# Patient Record
Sex: MALE | Race: WHITE | Employment: OTHER | URBAN - METROPOLITAN AREA
[De-identification: names, ages, dates, MRNs, and addresses within clinical notes are randomized per-mention and may not be internally consistent; named-entity substitution may affect disease eponyms.]

---

## 2021-12-09 ENCOUNTER — HOSPITAL ENCOUNTER (OUTPATIENT)
Dept: LAB | Age: 76
Discharge: HOME | End: 2021-12-09
Payer: COMMERCIAL

## 2021-12-09 ENCOUNTER — OFFICE VISIT (OUTPATIENT)
Dept: FAMILY MEDICINE CLINIC | Age: 76
End: 2021-12-09
Payer: COMMERCIAL

## 2021-12-09 ENCOUNTER — TELEPHONE (OUTPATIENT)
Dept: FAMILY MEDICINE CLINIC | Age: 76
End: 2021-12-09

## 2021-12-09 VITALS
OXYGEN SATURATION: 96 % | DIASTOLIC BLOOD PRESSURE: 80 MMHG | SYSTOLIC BLOOD PRESSURE: 160 MMHG | RESPIRATION RATE: 10 BRPM | WEIGHT: 218.6 LBS | HEART RATE: 67 BPM | TEMPERATURE: 98.1 F

## 2021-12-09 DIAGNOSIS — Z12.5 PROSTATE CANCER SCREENING: ICD-10-CM

## 2021-12-09 DIAGNOSIS — G30.0 EARLY ONSET ALZHEIMER'S DEMENTIA WITH BEHAVIORAL DISTURBANCE (HCC): ICD-10-CM

## 2021-12-09 DIAGNOSIS — N40.0 BENIGN PROSTATIC HYPERPLASIA WITHOUT LOWER URINARY TRACT SYMPTOMS: ICD-10-CM

## 2021-12-09 DIAGNOSIS — M25.50 ARTHRALGIA, UNSPECIFIED JOINT: ICD-10-CM

## 2021-12-09 DIAGNOSIS — R73.9 ELEVATED BLOOD SUGAR: Primary | ICD-10-CM

## 2021-12-09 DIAGNOSIS — I10 PRIMARY HYPERTENSION: ICD-10-CM

## 2021-12-09 DIAGNOSIS — M10.00 IDIOPATHIC GOUT, UNSPECIFIED CHRONICITY, UNSPECIFIED SITE: Primary | ICD-10-CM

## 2021-12-09 DIAGNOSIS — F02.818 EARLY ONSET ALZHEIMER'S DEMENTIA WITH BEHAVIORAL DISTURBANCE (HCC): ICD-10-CM

## 2021-12-09 DIAGNOSIS — B35.1 ONYCHOMYCOSIS: ICD-10-CM

## 2021-12-09 DIAGNOSIS — E55.9 VITAMIN D DEFICIENCY: ICD-10-CM

## 2021-12-09 LAB
ALBUMIN SERPL-MCNC: 3.8 G/DL (ref 3.4–5)
ALBUMIN/GLOB SERPL: 1.2 {RATIO} (ref 0.8–1.7)
ALP SERPL-CCNC: 82 U/L (ref 45–117)
ALT SERPL-CCNC: 28 U/L (ref 16–61)
ANION GAP SERPL CALC-SCNC: 7 MMOL/L (ref 3–18)
AST SERPL-CCNC: 19 U/L (ref 10–38)
BASOPHILS # BLD: 0.1 K/UL (ref 0–0.1)
BASOPHILS NFR BLD: 1 % (ref 0–2)
BILIRUB SERPL-MCNC: 0.5 MG/DL (ref 0.2–1)
BUN SERPL-MCNC: 28 MG/DL (ref 7–18)
BUN/CREAT SERPL: 17 (ref 12–20)
CALCIUM SERPL-MCNC: 8.9 MG/DL (ref 8.5–10.1)
CHLORIDE SERPL-SCNC: 110 MMOL/L (ref 100–111)
CHOLEST SERPL-MCNC: 255 MG/DL
CO2 SERPL-SCNC: 24 MMOL/L (ref 21–32)
CREAT SERPL-MCNC: 1.65 MG/DL (ref 0.6–1.3)
DIFFERENTIAL METHOD BLD: ABNORMAL
EOSINOPHIL # BLD: 0.4 K/UL (ref 0–0.4)
EOSINOPHIL NFR BLD: 6 % (ref 0–5)
ERYTHROCYTE [DISTWIDTH] IN BLOOD BY AUTOMATED COUNT: 12.1 % (ref 11.6–14.5)
GLOBULIN SER CALC-MCNC: 3.2 G/DL (ref 2–4)
GLUCOSE SERPL-MCNC: 126 MG/DL (ref 74–99)
HCT VFR BLD AUTO: 35.9 % (ref 36–48)
HDLC SERPL-MCNC: 40 MG/DL (ref 40–60)
HDLC SERPL: 6.4 {RATIO} (ref 0–5)
HGB BLD-MCNC: 11.8 G/DL (ref 13–16)
IMM GRANULOCYTES # BLD AUTO: 0 K/UL (ref 0–0.04)
IMM GRANULOCYTES NFR BLD AUTO: 1 % (ref 0–0.5)
LDLC SERPL CALC-MCNC: 171.4 MG/DL (ref 0–100)
LIPID PROFILE,FLP: ABNORMAL
LYMPHOCYTES # BLD: 1.2 K/UL (ref 0.9–3.6)
LYMPHOCYTES NFR BLD: 21 % (ref 21–52)
MCH RBC QN AUTO: 29.6 PG (ref 24–34)
MCHC RBC AUTO-ENTMCNC: 32.9 G/DL (ref 31–37)
MCV RBC AUTO: 90 FL (ref 78–100)
MONOCYTES # BLD: 0.4 K/UL (ref 0.05–1.2)
MONOCYTES NFR BLD: 7 % (ref 3–10)
NEUTS SEG # BLD: 3.9 K/UL (ref 1.8–8)
NEUTS SEG NFR BLD: 64 % (ref 40–73)
NRBC # BLD: 0 K/UL (ref 0–0.01)
NRBC BLD-RTO: 0 PER 100 WBC
PLATELET # BLD AUTO: 226 K/UL (ref 135–420)
PMV BLD AUTO: 10.8 FL (ref 9.2–11.8)
POTASSIUM SERPL-SCNC: 4 MMOL/L (ref 3.5–5.5)
PROT SERPL-MCNC: 7 G/DL (ref 6.4–8.2)
PSA SERPL-MCNC: 1.6 NG/ML (ref 0–4)
RBC # BLD AUTO: 3.99 M/UL (ref 4.35–5.65)
SODIUM SERPL-SCNC: 141 MMOL/L (ref 136–145)
TRIGL SERPL-MCNC: 218 MG/DL (ref ?–150)
VLDLC SERPL CALC-MCNC: 43.6 MG/DL
WBC # BLD AUTO: 6 K/UL (ref 4.6–13.2)

## 2021-12-09 PROCEDURE — 99204 OFFICE O/P NEW MOD 45 MIN: CPT | Performed by: FAMILY MEDICINE

## 2021-12-09 PROCEDURE — 80053 COMPREHEN METABOLIC PANEL: CPT

## 2021-12-09 PROCEDURE — 80061 LIPID PANEL: CPT

## 2021-12-09 PROCEDURE — 86592 SYPHILIS TEST NON-TREP QUAL: CPT

## 2021-12-09 PROCEDURE — 85025 COMPLETE CBC W/AUTO DIFF WBC: CPT

## 2021-12-09 PROCEDURE — G0103 PSA SCREENING: HCPCS

## 2021-12-09 PROCEDURE — 84153 ASSAY OF PSA TOTAL: CPT

## 2021-12-09 RX ORDER — TAMSULOSIN HYDROCHLORIDE 0.4 MG/1
CAPSULE ORAL
COMMUNITY
Start: 2021-10-03 | End: 2021-12-09 | Stop reason: SDUPTHER

## 2021-12-09 RX ORDER — COLCHICINE 0.6 MG/1
0.6 TABLET ORAL 2 TIMES DAILY
Qty: 40 TABLET | Refills: 2 | Status: SHIPPED | OUTPATIENT
Start: 2021-12-09

## 2021-12-09 RX ORDER — COLCHICINE 0.6 MG/1
TABLET ORAL
COMMUNITY
Start: 2021-11-15 | End: 2021-12-09 | Stop reason: SDUPTHER

## 2021-12-09 RX ORDER — AA/PROT/LYSINE/METHIO/VIT C/B6 50-12.5 MG
TABLET ORAL
COMMUNITY
End: 2022-07-25

## 2021-12-09 RX ORDER — ALLOPURINOL 100 MG/1
100 TABLET ORAL 2 TIMES DAILY
Qty: 40 TABLET | Refills: 1 | Status: SHIPPED | OUTPATIENT
Start: 2021-12-09 | End: 2022-01-17

## 2021-12-09 RX ORDER — CELECOXIB 200 MG/1
200 CAPSULE ORAL 2 TIMES DAILY
Qty: 60 CAPSULE | Refills: 2 | Status: SHIPPED | OUTPATIENT
Start: 2021-12-09 | End: 2022-02-28

## 2021-12-09 RX ORDER — TAMSULOSIN HYDROCHLORIDE 0.4 MG/1
0.4 CAPSULE ORAL DAILY
Qty: 90 CAPSULE | Refills: 1 | Status: SHIPPED | OUTPATIENT
Start: 2021-12-09

## 2021-12-09 RX ORDER — FINASTERIDE 5 MG/1
TABLET, FILM COATED ORAL
COMMUNITY
Start: 2021-10-04 | End: 2021-12-09 | Stop reason: SDUPTHER

## 2021-12-09 RX ORDER — LABETALOL 300 MG/1
300 TABLET, FILM COATED ORAL 2 TIMES DAILY
Qty: 60 TABLET | Refills: 5 | Status: SHIPPED | OUTPATIENT
Start: 2021-12-09

## 2021-12-09 RX ORDER — TRIAMTERENE AND HYDROCHLOROTHIAZIDE 37.5; 25 MG/1; MG/1
CAPSULE ORAL
COMMUNITY
Start: 2021-09-04 | End: 2021-12-09 | Stop reason: SDUPTHER

## 2021-12-09 RX ORDER — TRIAMTERENE AND HYDROCHLOROTHIAZIDE 37.5; 25 MG/1; MG/1
1 CAPSULE ORAL DAILY
Qty: 90 CAPSULE | Refills: 1 | Status: SHIPPED | OUTPATIENT
Start: 2021-12-09 | End: 2022-07-25

## 2021-12-09 RX ORDER — FINASTERIDE 5 MG/1
5 TABLET, FILM COATED ORAL DAILY
Qty: 90 TABLET | Refills: 1 | Status: SHIPPED | OUTPATIENT
Start: 2021-12-09

## 2021-12-09 RX ORDER — ALLOPURINOL 100 MG/1
TABLET ORAL
COMMUNITY
Start: 2021-09-04 | End: 2021-12-09 | Stop reason: SDUPTHER

## 2021-12-09 RX ORDER — LABETALOL 300 MG/1
TABLET, FILM COATED ORAL
COMMUNITY
Start: 2021-11-30 | End: 2021-12-09 | Stop reason: SDUPTHER

## 2021-12-09 NOTE — PROGRESS NOTES
Amy Keith is a 77 y.o. male  presents for establish care. He has chronic issues but recently has memory problems and foot pain. Allergies   Allergen Reactions    Other Medication Unknown (comments)     Patient says he is allergic to different medications but is not sure what they are. Outpatient Medications Marked as Taking for the 12/9/21 encounter (Office Visit) with Cheyenne Patton MD   Medication Sig Dispense Refill    coenzyme q10 (Co Q-10) 10 mg cap Take  by mouth.  cholecalciferol, vitamin D3, (VITAMIN D3 PO) Take  by mouth.  ascorbic acid (VITAMIN C PO) Take  by mouth.  tamsulosin (FLOMAX) 0.4 mg capsule Take 1 Capsule by mouth daily. 90 Capsule 1    finasteride (PROSCAR) 5 mg tablet Take 1 Tablet by mouth daily. 90 Tablet 1    colchicine 0.6 mg tablet Take 1 Tablet by mouth two (2) times a day. 40 Tablet 2    allopurinoL (ZYLOPRIM) 100 mg tablet Take 1 Tablet by mouth two (2) times a day. 40 Tablet 1    labetaloL (NORMODYNE) 300 mg tablet Take 1 Tablet by mouth two (2) times a day. 60 Tablet 5    triamterene-hydroCHLOROthiazide (DYAZIDE) 37.5-25 mg per capsule Take 1 Capsule by mouth daily. 90 Capsule 1    celecoxib (CELEBREX) 200 mg capsule Take 1 Capsule by mouth two (2) times a day for 90 days. 60 Capsule 2     There is no problem list on file for this patient. Past Medical History:   Diagnosis Date    Arthritis     Tompkins esophagus     DM (diabetes mellitus) ([facility] [address]     H/O kidney removal     Left    Hernia, umbilical     HTN (hypertension)     Hypercholesteremia     Neuropathy     Poor circulation      Social History     Tobacco Use    Smoking status: Former Smoker    Smokeless tobacco: Former User   Substance and Sexual Activity    Alcohol use: Not Currently     No family history on file. Review of Systems   Constitutional: Negative for chills, fever, malaise/fatigue and weight loss. Eyes: Negative for blurred vision.    Respiratory: Negative for cough, shortness of breath and wheezing. Cardiovascular: Negative for chest pain. Gastrointestinal: Negative for nausea and vomiting. Musculoskeletal: Positive for joint pain. Negative for myalgias. Skin: Negative for rash. Neurological: Negative for weakness. Psychiatric/Behavioral: Positive for memory loss. Vitals:    12/09/21 1048   BP: (!) 160/80   Pulse: 67   Resp: 10   Temp: 98.1 °F (36.7 °C)   TempSrc: Tympanic   SpO2: 96%   Weight: 218 lb 9.6 oz (99.2 kg)   PainSc:   5   PainLoc: Generalized       Physical Exam  Vitals and nursing note reviewed. Constitutional:       Appearance: Normal appearance. Neck:      Thyroid: No thyromegaly. Cardiovascular:      Rate and Rhythm: Normal rate and regular rhythm. Pulses: Normal pulses. Heart sounds: Normal heart sounds. Pulmonary:      Effort: Pulmonary effort is normal.      Breath sounds: Normal breath sounds. Musculoskeletal:         General: Normal range of motion. Cervical back: Normal range of motion and neck supple. Skin:     General: Skin is warm and dry. Neurological:      Mental Status: He is alert and oriented to person, place, and time. Psychiatric:         Mood and Affect: Mood normal.         Assessment/Plan      ICD-10-CM ICD-9-CM    1. Idiopathic gout, unspecified chronicity, unspecified site  M10.00 274.9 colchicine 0.6 mg tablet      allopurinoL (ZYLOPRIM) 100 mg tablet   2. Benign prostatic hyperplasia without lower urinary tract symptoms  N40.0 600.00 tamsulosin (FLOMAX) 0.4 mg capsule      finasteride (PROSCAR) 5 mg tablet   3. Primary hypertension  I10 401.9 labetaloL (NORMODYNE) 300 mg tablet      triamterene-hydroCHLOROthiazide (DYAZIDE) 37.5-25 mg per capsule      CBC WITH AUTOMATED DIFF      LIPID PANEL      METABOLIC PANEL, COMPREHENSIVE   4. Vitamin D deficiency  E55.9 268.9    5. Prostate cancer screening  Z12.5 V76.44 PSA SCREENING (SCREENING)   6.  Arthralgia, unspecified joint  M25.50 719.40 celecoxib (CELEBREX) 200 mg capsule   7. 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 I have discussed the diagnosis with the patient and the intended plan of care as seen in the above orders. The patient has received an after-visit summary and questions were answered concerning future plans. I have discussed medication, side effects, and warnings with the patient in detail. The patient verbalized understanding and is in agreement with the plan of care. The patient will contact the office with any additional concerns.       lab results and schedule of future lab studies reviewed with patient    Gali Kat MD

## 2021-12-09 NOTE — PROGRESS NOTES
New patient here to establish care. He would like a referral to Podiatry for nail fungus, c/o arthritic pain all over. He has been shaking a lot lately. Needs medication refills. 1. \"Have you been to the ER, urgent care clinic since your last visit? Hospitalized since your last visit? \" No    2. \"Have you seen or consulted any other health care providers outside of the [address] [place] [address] since your last visit? \" No     3. For patients aged 39-70: Has the patient had a colonoscopy / FIT/ Cologuard? No     If the patient is female:    4. For patients aged 41-77: Has the patient had a mammogram within the past 2 years? NA based on age or sex    11. For patients aged 21-65: Has the patient had a pap smear?  NA based on age or sex

## 2021-12-09 NOTE — PATIENT INSTRUCTIONS
Benign Prostatic Hyperplasia: Care Instructions  Your Care Instructions     Benign prostatic hyperplasia, or BPH, is an enlarged prostate gland. The prostate is a small gland that makes some of the fluid in semen. Prostate enlargement happens to almost all men as they age. It is usually not serious. BPH does not cause prostate cancer. As the prostate gets bigger, it may partly block the flow of urine. You may have a hard time getting a urine stream started or completely stopped. You may have a weak urine stream, or you may have to urinate more often than you used to, especially at night. Most men find these problems easy to manage. You do not need treatment unless your symptoms bother you a lot or you have other problems, such as bladder infections or stones. In these cases, medicines may help. Surgery is not needed unless the urine flow is blocked or the symptoms do not get better with medicine. Follow-up care is a key part of your treatment and safety. Be sure to make and go to all appointments, and call your doctor if you are having problems. It's also a good idea to know your test results and keep a list of the medicines you take. How can you care for yourself at home? · Urinate as much as you can, relax for a few moments, and then try to urinate again. · Sit on the toilet to urinate. · Avoid caffeine and alcohol. These drinks will increase how often you need to urinate. · Many over-the-counter cold and allergy medicines can make the symptoms of BPH worse. Avoid antihistamines, decongestants, and allergy pills, if you can. Read the warnings on the package. · If you take any prescription medicines such as muscle relaxants, pain medicines, or medicines for depression or anxiety, ask your doctor or pharmacist if they can cause urination problems. When should you call for help?    Call your doctor now or seek immediate medical care if:    · You cannot urinate at all.     · You have symptoms of a urinary infection. For example:  ? You have blood or pus in your urine. ? You have pain in your back just below your rib cage. This is called flank pain. ? You have a fever, chills, or body aches. ? It hurts to urinate. ? You have groin or belly pain. Watch closely for changes in your health, and be sure to contact your doctor if:    · It hurts when you ejaculate.     · Your urinary problems get a lot worse or bother you a lot. Where can you learn more? Go to http://www.gray.com/  Enter Z714 in the search box to learn more about \"Benign Prostatic Hyperplasia: Care Instructions. \"  Current as of: February 10, 2021               Content Version: 13.0  © 2006-2021 [employer]. Care instructions adapted under license by [employer] (which disclaims liability or warranty for this information). If you have questions about a medical condition or this instruction, always ask your healthcare professional. [place] [address] any warranty or liability for your use of this information.

## 2021-12-09 NOTE — TELEPHONE ENCOUNTER
Chief Complaint   Patient presents with    Attention Deficit Disorder     1. Have you been to the ER, urgent care clinic since your last visit? Hospitalized since your last visit? No    2. Have you seen or consulted any other health care providers outside of the [address] [place] [address] since your last visit? Include any pap smears or colon screening.  No Patient is requesting a New order for a Blood Glucose Meter, he  Needs everything that goes with the meter. Please review and advise, Patient can be reached at [phone].

## 2021-12-10 LAB — RPR SER QL: NONREACTIVE

## 2021-12-13 RX ORDER — BLOOD SUGAR DIAGNOSTIC
STRIP MISCELLANEOUS
Qty: 100 STRIP | Refills: 5 | Status: SHIPPED | OUTPATIENT
Start: 2021-12-13 | End: 2022-07-25

## 2021-12-13 RX ORDER — LANCING DEVICE/LANCETS
KIT MISCELLANEOUS
Qty: 1 KIT | Refills: 2 | Status: SHIPPED | OUTPATIENT
Start: 2021-12-13 | End: 2022-07-25

## 2021-12-13 RX ORDER — BLOOD-GLUCOSE METER
EACH MISCELLANEOUS
Qty: 1 EACH | Refills: 0 | Status: SHIPPED | OUTPATIENT
Start: 2021-12-13 | End: 2022-07-25

## 2021-12-14 ENCOUNTER — TELEPHONE (OUTPATIENT)
Dept: FAMILY MEDICINE CLINIC | Age: 76
End: 2021-12-14

## 2021-12-14 DIAGNOSIS — R73.9 ELEVATED BLOOD SUGAR: Primary | ICD-10-CM

## 2021-12-14 NOTE — TELEPHONE ENCOUNTER
Buster Ly, Patient daughter, called requesting an order for Free style lite glucose meter with Lancets and test strips, patient insurance  Does not cover the orders that was submitted on 12/09/21.

## 2021-12-22 RX ORDER — BLOOD-GLUCOSE METER
KIT MISCELLANEOUS
Qty: 100 STRIP | Refills: 2 | Status: SHIPPED | OUTPATIENT
Start: 2021-12-22 | End: 2022-07-25

## 2021-12-22 RX ORDER — LANCETS
EACH MISCELLANEOUS
Qty: 100 EACH | Refills: 3 | Status: SHIPPED | OUTPATIENT
Start: 2021-12-22 | End: 2022-07-25

## 2021-12-22 RX ORDER — INSULIN PUMP SYRINGE, 3 ML
EACH MISCELLANEOUS
Qty: 1 KIT | Refills: 0 | Status: SHIPPED | OUTPATIENT
Start: 2021-12-22 | End: 2022-07-25

## 2022-01-05 ENCOUNTER — TELEPHONE (OUTPATIENT)
Dept: FAMILY MEDICINE CLINIC | Age: 77
End: 2022-01-05

## 2022-01-05 NOTE — TELEPHONE ENCOUNTER
Patients daughter Bethany Araujo called the office stating patient has been having abdominal pain x 4 days, he has also had cough, SOB, and tightness in his chest x 2 days. She says she can hear patient wheezing at night. She was advised to take patient to  and has agreed with this plan. Samantha says she also has concerns with patients behavior and shaking. She says his appt is not until March but she wanted to know if he can be seen sooner. Told Samantha I will send a message to Dr. Yesica Neumann nurse who will look at his schedule to see if patients appt can be moved up. Nancy Cochran would like a return call at [phone].

## 2022-01-15 DIAGNOSIS — M10.00 IDIOPATHIC GOUT, UNSPECIFIED CHRONICITY, UNSPECIFIED SITE: ICD-10-CM

## 2022-01-17 RX ORDER — ALLOPURINOL 100 MG/1
TABLET ORAL
Qty: 40 TABLET | Refills: 1 | Status: SHIPPED | OUTPATIENT
Start: 2022-01-17 | End: 2022-02-28

## 2022-02-27 DIAGNOSIS — M10.00 IDIOPATHIC GOUT, UNSPECIFIED CHRONICITY, UNSPECIFIED SITE: ICD-10-CM

## 2022-02-28 DIAGNOSIS — M25.50 ARTHRALGIA, UNSPECIFIED JOINT: ICD-10-CM

## 2022-02-28 RX ORDER — ALLOPURINOL 100 MG/1
TABLET ORAL
Qty: 40 TABLET | Refills: 1 | Status: SHIPPED | OUTPATIENT
Start: 2022-02-28

## 2022-02-28 RX ORDER — CELECOXIB 200 MG/1
CAPSULE ORAL
Qty: 60 CAPSULE | Refills: 2 | Status: SHIPPED | OUTPATIENT
Start: 2022-02-28 | End: 2022-07-25

## 2022-04-11 ENCOUNTER — DOCUMENTATION ONLY (OUTPATIENT)
Dept: PULMONOLOGY | Age: 77
End: 2022-04-11

## 2022-04-11 NOTE — PROGRESS NOTES
Sander am for pt's daughter, Makayla [place] [phone] to pretty np appt - sob - Dr Luis Ramirez in file cabinet

## 2022-04-27 ENCOUNTER — OFFICE VISIT (OUTPATIENT)
Dept: PULMONOLOGY | Age: 77
End: 2022-04-27
Payer: MEDICARE

## 2022-04-27 VITALS
SYSTOLIC BLOOD PRESSURE: 114 MMHG | OXYGEN SATURATION: 97 % | DIASTOLIC BLOOD PRESSURE: 66 MMHG | WEIGHT: 217 LBS | HEIGHT: 69 IN | HEART RATE: 66 BPM | BODY MASS INDEX: 32.14 KG/M2

## 2022-04-27 DIAGNOSIS — R06.00 DYSPNEA, UNSPECIFIED TYPE: Primary | ICD-10-CM

## 2022-04-27 PROCEDURE — 94729 DIFFUSING CAPACITY: CPT | Performed by: INTERNAL MEDICINE

## 2022-04-27 PROCEDURE — 94727 GAS DIL/WSHOT DETER LNG VOL: CPT | Performed by: INTERNAL MEDICINE

## 2022-04-27 PROCEDURE — 94060 EVALUATION OF WHEEZING: CPT | Performed by: INTERNAL MEDICINE

## 2022-05-11 ENCOUNTER — OFFICE VISIT (OUTPATIENT)
Dept: PULMONOLOGY | Age: 77
End: 2022-05-11
Payer: MEDICARE

## 2022-05-11 VITALS
RESPIRATION RATE: 18 BRPM | HEIGHT: 69 IN | BODY MASS INDEX: 32.23 KG/M2 | OXYGEN SATURATION: 97 % | WEIGHT: 217.6 LBS | SYSTOLIC BLOOD PRESSURE: 170 MMHG | TEMPERATURE: 98.2 F | HEART RATE: 63 BPM | DIASTOLIC BLOOD PRESSURE: 80 MMHG

## 2022-05-11 DIAGNOSIS — R93.89 ABNORMAL CXR: Primary | ICD-10-CM

## 2022-05-11 DIAGNOSIS — J44.9 CHRONIC OBSTRUCTIVE PULMONARY DISEASE, UNSPECIFIED COPD TYPE (HCC): ICD-10-CM

## 2022-05-11 DIAGNOSIS — M25.50 ARTHRALGIA, UNSPECIFIED JOINT: ICD-10-CM

## 2022-05-11 DIAGNOSIS — E11.8 CONTROLLED TYPE 2 DIABETES MELLITUS WITH COMPLICATION, WITHOUT LONG-TERM CURRENT USE OF INSULIN (HCC): ICD-10-CM

## 2022-05-11 DIAGNOSIS — Z87.891 EX-SMOKER: ICD-10-CM

## 2022-05-11 DIAGNOSIS — Z90.5 HISTORY OF NEPHRECTOMY: ICD-10-CM

## 2022-05-11 DIAGNOSIS — Z86.16 HISTORY OF COVID-19: ICD-10-CM

## 2022-05-11 DIAGNOSIS — R06.2 WHEEZING: ICD-10-CM

## 2022-05-11 DIAGNOSIS — R06.09 DYSPNEA ON EXERTION: ICD-10-CM

## 2022-05-11 PROCEDURE — G8536 NO DOC ELDER MAL SCRN: HCPCS | Performed by: INTERNAL MEDICINE

## 2022-05-11 PROCEDURE — 1101F PT FALLS ASSESS-DOCD LE1/YR: CPT | Performed by: INTERNAL MEDICINE

## 2022-05-11 PROCEDURE — G8427 DOCREV CUR MEDS BY ELIG CLIN: HCPCS | Performed by: INTERNAL MEDICINE

## 2022-05-11 PROCEDURE — G8417 CALC BMI ABV UP PARAM F/U: HCPCS | Performed by: INTERNAL MEDICINE

## 2022-05-11 PROCEDURE — 99205 OFFICE O/P NEW HI 60 MIN: CPT | Performed by: INTERNAL MEDICINE

## 2022-05-11 PROCEDURE — G8510 SCR DEP NEG, NO PLAN REQD: HCPCS | Performed by: INTERNAL MEDICINE

## 2022-05-11 PROCEDURE — 94618 PULMONARY STRESS TESTING: CPT | Performed by: INTERNAL MEDICINE

## 2022-05-11 RX ORDER — SERTRALINE HYDROCHLORIDE 100 MG/1
100 TABLET, FILM COATED ORAL DAILY
COMMUNITY
Start: 2022-03-09

## 2022-05-11 RX ORDER — FLUTICASONE FUROATE, UMECLIDINIUM BROMIDE AND VILANTEROL TRIFENATATE 200; 62.5; 25 UG/1; UG/1; UG/1
1 POWDER RESPIRATORY (INHALATION) DAILY
Qty: 1 EACH | Refills: 0 | Status: SHIPPED | COMMUNITY
Start: 2022-05-11 | End: 2022-09-12

## 2022-05-11 RX ORDER — ALBUTEROL SULFATE 90 UG/1
1 AEROSOL, METERED RESPIRATORY (INHALATION)
Qty: 1 EACH | Refills: 0 | Status: SHIPPED | COMMUNITY
Start: 2022-05-11 | End: 2022-05-11 | Stop reason: CLARIF

## 2022-05-11 RX ORDER — FLUTICASONE FUROATE, UMECLIDINIUM BROMIDE AND VILANTEROL TRIFENATATE 200; 62.5; 25 UG/1; UG/1; UG/1
1 POWDER RESPIRATORY (INHALATION) DAILY
Qty: 1 EACH | Refills: 3 | Status: SHIPPED | OUTPATIENT
Start: 2022-05-11 | End: 2022-07-25

## 2022-05-11 RX ORDER — ALBUTEROL SULFATE 90 UG/1
1 AEROSOL, METERED RESPIRATORY (INHALATION)
Qty: 1 EACH | Refills: 2 | Status: SHIPPED | OUTPATIENT
Start: 2022-05-11

## 2022-05-11 NOTE — PROGRESS NOTES
Margotjhon Mota presents today for   Chief Complaint   Patient presents with    Shortness of Breath     on exertion        Is someone accompanying this pt? Daughter    Is the patient using any DME equipment during [address]? No    -DME Company NA    Depression Screening:  3 most recent PHQ Screens 12/9/2021   Little interest or pleasure in doing things Not at all   Feeling down, depressed, irritable, or hopeless Not at all   Total Score PHQ 2 0       Learning Assessment:  Learning Assessment 5/11/2022   PRIMARY LEARNER Patient   PRIMARY LANGUAGE ENGLISH   LEARNER PREFERENCE PRIMARY DEMONSTRATION   RELATIONSHIP SELF       Abuse Screening:  No flowsheet data found. Fall Risk  No flowsheet data found. Coordination of Care:  1. Have you been to the ER, urgent care clinic since your last visit? Hospitalized since your last visit? Yes; Name: 1/2022 [facility] ED SOB     2. Have you seen or consulted any other health care providers outside of the [address] [place] [address] since your last visit? Include any pap smears or colon screening.  No

## 2022-05-11 NOTE — PROGRESS NOTES
Order placed for 6 minute walk test, per Verbal Order from Dr. Zonia Cam on 5/11/2022. Last office visit: 5/11/2022  Follow up Visit: Due 3 Months ( Aug 2022)    Provider is aware of last office visit and follow up.

## 2022-05-11 NOTE — PATIENT INSTRUCTIONS
Please call our clinic back at [phone] or send a message on [employer] if you have any questions or concerns or if you are experiencing any of the following:     You have not received a follow up appointment within 30 days prior the recommended follow up time.  If you are not tolerating treatment plan and/or not able to obtain equipment or prescribed medication(s).  if you are experiencing any difficulties with the Durable Medical Equipment  (DME) Company you may be using or is assigned to you.  Two weeks have passed and you have not received an appointment for a scheduled procedure.  Two weeks have passed since you underwent a test and/or procedure and you have not received your results. If you are using a CPAP/BIPAP, or Home Ventilator Device- Please note the following. Currently, many DMEs are experiencing supply chain difficulties and orders for equipment may be back logged several weeks to months. [address] [employer] ) company is supposed to provide you with replacement filters, tubing and masks. You can either call your DME when you need new supplies or you can arrange for an automatic shipment schedule.  Your need to be seen by our office at lat minimum of every 12 months in order to renew the prescription for these supplies.  Please make note of who your DME company is and their phone number.  Please make sure that you clean your mask and hosing on a regular basis. Your DME can provide you with additional information regarding proper care and cleaning of your device           Safety is strongly encouraged. You should not drive if sleepy, tired, distracted and/or  fatigued.        -Thank you       Instructions for how to correctly use a Metered Dose Inhaler (MDI)

## 2022-05-11 NOTE — PROGRESS NOTES
[facility]  Pulmonary, Critical Care, and Sleep Medicine    Office Progress Note- Initial Evaluation      Primary Care Physician: Ilene Jaime MD     Reason for Visit:  Evaluation for Dyspnea    Assessment:  1. Abnormal CXR: [place] noted on CXR Jan 2022. Patient COVID+ at that time. Patient needs repeat thoracic imaging  2. COPD: Non-reversible obstruction on recent PFTs. Patient had difficulty with testing. He appears to have reduced air movement on today's exam. No rhonchi, crackles or wheezing. He does not appear to have an acute exacerbation, but probably is sub-optimized. Patient denies a history of asthma. He does have a high eosinophil count which raises the question for possible asthma- will need to work up further  3. Dyspnea: Recent cardiac evaluation unremarkable, No oxygen requirement today on 6MW. Workup ongoing  4. Fatigue/EDS: could be due to several causes: deconditioning, COPD, possible YASIR, grief/depression (recent passing of his wife,   5. Arthralgias  6. Gout  7. Diabetes  8. CKD, S/P nephrectomy- distant- unclear why  9. Hypertension- BP is elevated today in clinic. He is asymptomatic. He does not consistently take his BP medicaton  10. Barrettes Esophagus- pending EGD  11. Resting/ Essential tremor  12. Depression: On Zoloft, Wife passes away in January 2022  13. Ex-smoker: 85 pack year  14. Obesity: Body mass index is 32.13 kg/m². Plan:  · Start patient on a trial of Trelegy- patient to call our clinic if he has difficulties getting long acting inhalers  · Start PRN albuterol  · Monitor EMMA/LABA effect on patient's resting tremor- I dicussed this with the patient and his daughter. · Check JERE, CBC, IgE, Allergy Panel, RF  · Obtain Non-contrast CT scan  · Future sleep testing  · Continue with smoking cessation  · Follow up with Primary Care Provider (PCP) as directed and for routine health care maintenance.   · Follow-up: 3 months , sooner should new symptoms or problems arise. History of Present Illness: Mr. Austin Conti is a 69 y.o. male patient who presents for evaluation for dyspnea. The history was provided by the patient, and his daughter. The patient is an ex-smoker. He was born and previously living in [place]. He moved down to the [facility] in the Fall of 2021. Per the patient's daughter, the patient was having a difficult time getting medication for the patient and his ill wife. The patient had been caring for his wife, who passed away in 2022. He is currently living in an apartment with another daughter next door. The patient reports that he has been experiencing increasing dyspnea on exertion ever since he got his COVID vaccination(s). In January 2022, the patient did indeed contract COVID-19. He went to [facility] ED but was discharged home. He was reports that he was not treated with steroids or other adjuvant therapies. The patient denies any sputum or hemoptysis. He denies a prior history of asthma. He does not use inhalers. He does note some intermittent coughing spells with near emesis. No fever or chills. He does note some intermittent mid-chest discomfort. He recently underwent a nuclear, cardiac  stress test which was unremarkable. He reports body aches and arthralgias. He reports a good appetite, no unintended weight loss. He does sweat during the night. He does not know if he snores. He is tired during the day- \"Im tired all the time\". He reports getting up to use the bathroom at night every 2-3 hoursHe does have a history of gout. No photosensitivity. No oral ulcers No history of childhood respiratory infections    Patient does have a history of Barrettes esophagus. He has not had an EGD for several years. He saw a GI provider earlier this week and is pending a repeat EGD. The patient's BP was elevated today in clinic. He is asymptomatic.  He isprescribed labetalol but he is not taking on a consistent basis    Occupation:   Retired: Prior  for [employer]- 30 years                         3 most recent [address] 2022   [address] [facility] [address] interest or pleasure in doing things Not at all   Feeling down, depressed, irritable, or hopeless Not at all   Total Score PHQ 2 0       Immunization History: There is no immunization history on file for this patient. Past Medical History:  Past Medical History:   Diagnosis Date    Arthritis     Tompkins esophagus     DM (diabetes mellitus) ([address]     H/O kidney removal     Left    Hernia, umbilical     HTN (hypertension)     Hypercholesteremia     Neuropathy     Poor circulation        Past Surgical History:  History reviewed. No pertinent surgical history. Family History:  History reviewed. No pertinent family history.     Social History:  Social History     Tobacco Use    Smoking status: Former Smoker     Packs/day: 2.50     Years: 34.00     Pack years: 85.00     Types: Cigarettes     Quit date: 2002     Years since quittin.8    Smokeless tobacco: Former User   Substance Use Topics    Alcohol use: Not Currently    Drug use: Not Currently          Medications:  Current Outpatient Medications on File Prior to Visit   Medication Sig Dispense Refill    allopurinoL (ZYLOPRIM) 100 mg tablet take 1 tablet by mouth twice a day 40 Tablet 1    celecoxib (CELEBREX) 200 mg capsule take 1 capsule by mouth twice a day (Patient not taking: Reported on 2022) 60 Capsule 2    Blood-Glucose Meter (FreeStyle Lite Meter) monitoring kit Use bid as directed 1 Kit 0    glucose blood VI test strips (FreeStyle Lite Strips) strip Use bid as directed 100 Strip 2    lancets misc Use bid 100 Each 3    Blood-Glucose Meter (Accu-Chek Elin Plus Meter) misc Use to check glucose daily E11.6 1 Each 0    glucose blood VI test strips (Accu-Chek Elin Plus test strp) strip Use to check glucose daily E11.6 100 Strip 5    Lancing Device with Lancets (Accu-Chek Soft Dev Lancets) kit Use to check glucose daily. E11.6 1 Kit 2    coenzyme q10 (Co Q-10) 10 mg cap Take  by mouth.  cholecalciferol, vitamin D3, (VITAMIN D3 PO) Take  by mouth.  ascorbic acid (VITAMIN C PO) Take  by mouth.  tamsulosin (FLOMAX) 0.4 mg capsule Take 1 Capsule by mouth daily. 90 Capsule 1    finasteride (PROSCAR) 5 mg tablet Take 1 Tablet by mouth daily. 90 Tablet 1    colchicine 0.6 mg tablet Take 1 Tablet by mouth two (2) times a day. 40 Tablet 2    sertraline (ZOLOFT) 100 mg tablet Take 100 mg by mouth daily.  labetaloL (NORMODYNE) 300 mg tablet Take 1 Tablet by mouth two (2) times a day. (Patient not taking: Reported on 5/11/2022) 60 Tablet 5    triamterene-hydroCHLOROthiazide (DYAZIDE) 37.5-25 mg per capsule Take 1 Capsule by mouth daily. (Patient not taking: Reported on 5/11/2022) 90 Capsule 1     No current facility-administered medications on file prior to visit. Allergy:  Allergies   Allergen Reactions    Other Medication Unknown (comments)     Patient says he is allergic to different medications but is not sure what they are.        Review of Systems  General ROS: positive for  - fever and night sweats  negative for - chills, fever, hot flashes, malaise or weight loss  ENT ROS: negative for - epistaxis, headaches,   nasal congestion, nasal discharge, nasal polyps, oral lesions, sinus pain, sneezing, sore throat, tinnitus, vertigo, visual changes or vocal changes, positive for hearing decrement  Hematological and Lymphatic ROS: negative for - bleeding problems, blood clots, bruising, jaundice, pallor or swollen lymph nodes  Endocrine ROS: negative for - polydipsia/polyuria, skin changes, temperature intolerance or unexpected weight changes  Respiratory ROS: positive for - shortness of breath and wheezing  negative for - hemoptysis, orthopnea, pleuritic pain, sputum changes or stridor  Cardiovascular ROS: positive for - chest pain, dyspnea on exertion and shortness of breath  negative for - edema, irregular heartbeat, loss of consciousness, orthopnea, palpitations or paroxysmal nocturnal dyspnea  Gastrointestinal ROS: no abdominal pain, change in bowel habits, or black or bloody stools  Genito-Urinary ROS: no dysuria, trouble voiding, or hematuria  Musculoskeletal ROS: positive for - joint pain and muscle pain  Neurological ROS: no TIA or stroke symptoms  Dermatological ROS: negative for - pruritus, rash or skin lesion changes   Psychological ROS: negative   Otherwise negative and per HPI      Physical Exam:  Blood pressure (!) 170/80, pulse 63, temperature 98.2 °F (36.8 °C), temperature source Oral, resp. rate 18, height 5' 9\" (1.753 m), weight 98.7 kg (217 lb 9.6 oz), SpO2 97 %. on RA, Body mass index is 32.13 kg/m². General: No distress, acyanotic, appears stated age, cooperative, pleasant. Light beard  HEENT: PERRL, EOMI, throat without erythema or exudate, Tongue- large and with dental indention on tongue, Mallampati's score 3+, Uvula- midline, and hypertrophied   Neck: Supple,  no abnormally enlarged lymph nodes, thyroid is not enlarged, non-tender, No JVD, no carotid bruits  Chest: Increased A-P diameter  Lungs: Modest air entry, clear to auscultation bilaterally  Heart: Regular rate and rhythm, S1S2 present, without murmur. Abdomen: Obese,   abdomen is soft without significant tenderness, or guarding. Extremity: Negative for cyanosis, edema, or clubbing. Skin: Skin color, texture, turgor normal. No rashes or lesions. Neurological: CN 2-12 grossly intact, normal muscle tone. + resting tremor    Data Reviewed:  CBC:   Lab Results   Component Value Date/Time    WBC 6.0 12/09/2021 11:13 AM    HGB 11.8 (L) 12/09/2021 11:13 AM    HCT 35.9 (L) 12/09/2021 11:13 AM    PLATELET 686 72/07/7530 11:13 AM    MCV 90.0 12/09/2021 11:13 AM     Results for Fam Starr (MRN [id number]) as of 5/11/2022 21:21   Ref.  Range 12/9/2021 11:13   EOSINOPHILS Latest Ref Range: 0 - 5 % 6 (H)   ABS. EOSINOPHILS Latest Ref Range: 0.0 - 0.4 K/UL 0.4           BMP:   Lab Results   Component Value Date/Time    Sodium 141 12/09/2021 11:13 AM    Potassium 4.0 12/09/2021 11:13 AM    Chloride 110 12/09/2021 11:13 AM    CO2 24 12/09/2021 11:13 AM    Anion gap 7 12/09/2021 11:13 AM    Glucose 126 (H) 12/09/2021 11:13 AM    BUN 28 (H) 12/09/2021 11:13 AM    Creatinine 1.65 (H) 12/09/2021 11:13 AM    BUN/Creatinine ratio 17 12/09/2021 11:13 AM    GFR est AA 50 (L) 12/09/2021 11:13 AM    GFR est non-AA 41 (L) 12/09/2021 11:13 AM    Calcium 8.9 12/09/2021 11:13 AM        TSH:  No results found for: TSH, TSH2, TSH3, TSHP, TSHELE, TSHEXT, TSHEXT    Imaging:  [x]I have personally reviewed the patients radiographs section     CXR: 1/13/2REPORT:   Low lung volumes.  Basilar predominant interstitial and small patchy airspace opacities are worrisome for infiltrates.  There is no pleural effusion or pneumothorax.  Trachea is midline.  Unremarkable cardiomediastinal silhouette.  Atherosclerotic calcification of thoracic aorta.  Degenerative changes of thoracic spine and shoulders. IMPRESSION:   Hypoinflated lungs.  Mild basilar predominant infiltrates. 2- [place]    No results found for this or any previous visit. No results found for this or any previous visit. Cardiac Echo:     2/28/22:    CONCLUSIONS     * Normal left ventricular cavity size and systolic function.     * LV ejection fraction 56% by biplane.     * Mild concentric left ventricular hypertrophy.     * Left ventricular diastolic function: normal.     * Mildly dilated left atrium.  Normal right atrial size.     * Normal right ventricular size and systolic function.     * There is mild mitral valve regurgitation.     * No pulmonary hypertension, estimated pulmonary arterial systolic pressure   is 25 mmHg. Comparison     * No prior study is available for comparison.      BEAU REST/STRESS MULTIPLE SPECT 2/28/22    Anatomical Region Laterality Modality   Chest -- Nuclear Medicine       Narrative  Performed by RADIOLOGY  CONCLUSIONS     * Low risk Lexiscan stress MPI.     * Patient had chest pain response to Lexiscan administration.     * No significant ECG changes.     * No evidence of ischemia or infarction.     * Overall left ventricular systolic function is normal without regional wall   motion abnormalities. LVEF 71%. Kobe Buneo* No transient ischemic dilatation.           Historical Sleep Testing Data:        Puja Bradshaw DO, [facility]  Pulmonary, Sleep and Critical Care Medicine

## 2022-05-11 NOTE — LETTER
5/11/2022    Patient: Austin Conti   YOB: 1945   Date of Visit: 5/11/2022     Jadyn Goodman MD  [address] [address]  Via Fax: [phone]    Dear Jadyn Goodman MD,      Thank you for referring Mr. Austin Conti to [address] [place] [address] for evaluation. My notes for this consultation are attached. If you have questions, please do not hesitate to call me. I look forward to following your patient along with you.       Sincerely,    Charles Acosta, DO

## 2022-05-19 ENCOUNTER — HOSPITAL ENCOUNTER (OUTPATIENT)
Dept: CT IMAGING | Age: 77
Discharge: HOME | End: 2022-05-19
Attending: INTERNAL MEDICINE
Payer: MEDICARE

## 2022-05-19 DIAGNOSIS — E11.8 CONTROLLED TYPE 2 DIABETES MELLITUS WITH COMPLICATION, WITHOUT LONG-TERM CURRENT USE OF INSULIN (HCC): ICD-10-CM

## 2022-05-19 DIAGNOSIS — Z86.16 HISTORY OF COVID-19: ICD-10-CM

## 2022-05-19 DIAGNOSIS — R06.09 DYSPNEA ON EXERTION: ICD-10-CM

## 2022-05-19 DIAGNOSIS — Z87.891 EX-SMOKER: ICD-10-CM

## 2022-05-19 DIAGNOSIS — M25.50 ARTHRALGIA, UNSPECIFIED JOINT: ICD-10-CM

## 2022-05-19 DIAGNOSIS — Z90.5 HISTORY OF NEPHRECTOMY: ICD-10-CM

## 2022-05-19 DIAGNOSIS — R93.89 ABNORMAL CXR: ICD-10-CM

## 2022-05-19 DIAGNOSIS — R06.2 WHEEZING: ICD-10-CM

## 2022-05-19 PROCEDURE — 71250 CT THORAX DX C-: CPT

## 2022-06-14 LAB
CREATININE, EXTERNAL: 1.6
HBA1C MFR BLD HPLC: 6.5 %
LDL-C, EXTERNAL: 246
PSA, EXTERNAL: 1.02

## 2022-07-18 DIAGNOSIS — R91.8 PULMONARY NODULES: Primary | ICD-10-CM

## 2022-07-18 NOTE — PROGRESS NOTES
Pulmonary nodules- needs 12 month CT follow up in May 2023    CT Results (most recent):  Results from Hospital Encounter encounter on 05/19/22    CT CHEST HIGH RES WO CONT    Narrative  EXAM:  CT Chest without Contrast.    CLINICAL INDICATION:    - Abnormal CXR [R93.89]  - Ex-smoker [A15.061]  - Dyspnea on exertion [R06.00]  - History of COVID-19 [Z86.16]  - Controlled type 2 diabetes mellitus with complication, without long-term  current use of insulin [E11.8]  - History of nephrectomy [Z90.5]  - Arthralgia, unspecified joint [M25.50]  - Wheezing [R06.2]  - 85 pack-year ex-smoker, BARCENAS, Abnormal CXR Jan 2022, eval for ILD    COMPARISON:  No prior CTs available. TECHNIQUE:  Helically scanned volumetric axial sections of the chest are  obtained with without IV contrast administration using current institutional  modified standard HRCT protocol, i.e. supine end-inspiratory phase, supine  end-expiratory phase and prone end-inspiratory phase scans with axial 1.5 mm  reformats in 10 mm increments. Coronal and sagittal multiplanar reconstruction  images are generated for improved anatomic delineation. Radiation dose optimization techniques are utilized as appropriate to the exam,  with combination of automated exposure control, adjustment of the mA and/or kV  according to patient's size (including appropriate matching for site-specific  examinations), or use of iterative reconstruction technique. FINDINGS:    Lungs:    - No infiltrate or consolidation is identified. - 3 mm right upper lobe nodule (axial #16). 2 mm right upper lobe nodule (axial  #16). 2 mm right middle lobe nodule (axial#33). 3 mm right lower lobe nodule  (axial #29). - No interstitial thickening bilaterally. Pleura:  No significant pleural effusion is detected bilaterally. Mediastinum:  No adenopathy. Base of Neck:  No acute abnormalities. Axillae:  No adenopathy. Esophagus:  Unremarkable.     Abdomen:  Multiple cysts in the right lobe of liver. The largest cysts measure  2.9 x 2.5 cm and 3.0 x 1.9 cm. Bones:  No acute findings. Impression  1. Multiple lung nodules as described. Recommend followup CT in 12 months. 2.  Hepatic cysts. 3.  No interstitial thickening.     Santos DO Tobin, FCCP    [facility]  Pulmonary, Critical Care, and Sleep Medicine

## 2022-07-25 RX ORDER — ROSUVASTATIN CALCIUM 5 MG/1
5 TABLET, COATED ORAL DAILY
COMMUNITY
Start: 2022-06-20

## 2022-07-25 RX ORDER — TRIAMCINOLONE ACETONIDE 0.25 MG/ML
LOTION TOPICAL 2 TIMES DAILY
COMMUNITY
End: 2022-07-25

## 2022-07-25 RX ORDER — ASCORBIC ACID 500 MG
1000 TABLET ORAL DAILY
COMMUNITY

## 2022-07-25 RX ORDER — ACETAMINOPHEN 500 MG
2000 TABLET ORAL DAILY
COMMUNITY

## 2022-07-25 NOTE — PERIOP NOTES
PRE-SURGICAL INSTRUCTIONS        Patient's Name:  Adriana VERA Date:  7/25/2022            Covid Testing Date and Time:    Surgery Date:  7/28/2022                Do NOT eat or drink anything, including candy, gum, or ice chips after midnight on 07/27, unless you have specific instructions from your surgeon or anesthesia provider to do so. You may brush your teeth before coming to the hospital.  No smoking 24 hours prior to the day of surgery. No alcohol 24 hours prior to the day of surgery. No recreational drugs for one week prior to the day of surgery. Leave all valuables, including money/purse, at home. Remove all jewelry, nail polish, acrylic nails, and makeup (including mascara); no lotions powders, deodorant, or perfume/cologne/after shave on the skin. Follow instruction for Hibiclens washes and CHG wipes from surgeon's office. Glasses/contact lenses and dentures may be worn to the hospital.  They will be removed prior to surgery. Call your doctor if symptoms of a cold or illness develop within 24-48 hours prior to your surgery. 11.  If you are having an outpatient procedure, please make arrangements for a responsible ADULT TO [address] [place] [address] and stay with you for 24 hours after your surgery. 12. ONE VISITOR in the hospital at this time for outpatient procedures. Exceptions may be made for surgical admissions, per nursing unit guidelines      Special Instructions:      Bring COVID card. Bring list of CURRENT medications. Bring inhaler. Bring any pertinent legal medical records. Take these medications the morning of surgery with a sip of water:  Use Trelegy. Take Labetalol. Follow physician instructions about stopping anticoagulants. Complete bowel prep per MD instructions. On the day of surgery, come in the main entrance of [facility]. Let the  at the desk know you are there for surgery.   A staff member will come escort you to the surgical area on the second floor. If you have any questions or concerns, please do not hesitate to call:     (Prior to the day of surgery) PAT department:  [phone]   (Day of surgery) Pre-Op department:  [phone]    These surgical instructions were reviewed with Jimbo Morris and Mary Ly during the [facility] phone call.

## 2022-07-27 ENCOUNTER — ANESTHESIA EVENT (OUTPATIENT)
Dept: ENDOSCOPY | Age: 77
End: 2022-07-27
Payer: MEDICARE

## 2022-07-28 ENCOUNTER — ANESTHESIA (OUTPATIENT)
Dept: ENDOSCOPY | Age: 77
End: 2022-07-28
Payer: MEDICARE

## 2022-07-28 ENCOUNTER — HOSPITAL ENCOUNTER (OUTPATIENT)
Age: 77
Setting detail: OUTPATIENT SURGERY
Discharge: HOME | End: 2022-07-28
Attending: INTERNAL MEDICINE | Admitting: INTERNAL MEDICINE
Payer: MEDICARE

## 2022-07-28 VITALS
OXYGEN SATURATION: 96 % | DIASTOLIC BLOOD PRESSURE: 73 MMHG | SYSTOLIC BLOOD PRESSURE: 120 MMHG | HEART RATE: 56 BPM | RESPIRATION RATE: 17 BRPM | TEMPERATURE: 97.4 F | HEIGHT: 69 IN | BODY MASS INDEX: 31.1 KG/M2 | WEIGHT: 210 LBS

## 2022-07-28 LAB
GLUCOSE BLD STRIP.AUTO-MCNC: 129 MG/DL (ref 70–110)
GLUCOSE BLD STRIP.AUTO-MCNC: 137 MG/DL (ref 70–110)

## 2022-07-28 PROCEDURE — 99100 ANES PT EXTEME AGE<1 YR&>70: CPT | Performed by: ANESTHESIOLOGY

## 2022-07-28 PROCEDURE — 00813 ANES UPR LWR GI NDSC PX: CPT | Performed by: NURSE ANESTHETIST, CERTIFIED REGISTERED

## 2022-07-28 PROCEDURE — 88305 TISSUE EXAM BY PATHOLOGIST: CPT

## 2022-07-28 PROCEDURE — 99100 ANES PT EXTEME AGE<1 YR&>70: CPT | Performed by: NURSE ANESTHETIST, CERTIFIED REGISTERED

## 2022-07-28 PROCEDURE — 76060000032 HC ANESTHESIA 0.5 TO 1 HR: Performed by: INTERNAL MEDICINE

## 2022-07-28 PROCEDURE — 76040000007: Performed by: INTERNAL MEDICINE

## 2022-07-28 PROCEDURE — 2709999900 HC NON-CHARGEABLE SUPPLY: Performed by: INTERNAL MEDICINE

## 2022-07-28 PROCEDURE — 74011250636 HC RX REV CODE- 250/636: Performed by: NURSE ANESTHETIST, CERTIFIED REGISTERED

## 2022-07-28 PROCEDURE — 74011000250 HC RX REV CODE- 250: Performed by: NURSE ANESTHETIST, CERTIFIED REGISTERED

## 2022-07-28 PROCEDURE — 82962 GLUCOSE BLOOD TEST: CPT

## 2022-07-28 PROCEDURE — 00813 ANES UPR LWR GI NDSC PX: CPT | Performed by: ANESTHESIOLOGY

## 2022-07-28 PROCEDURE — 77030008565 HC TBNG SUC IRR ERBE -B: Performed by: INTERNAL MEDICINE

## 2022-07-28 PROCEDURE — 77030021593 HC FCPS BIOP ENDOSC BSC -A: Performed by: INTERNAL MEDICINE

## 2022-07-28 RX ORDER — LIDOCAINE HYDROCHLORIDE 10 MG/ML
0.1 INJECTION, SOLUTION EPIDURAL; INFILTRATION; INTRACAUDAL; PERINEURAL AS NEEDED
Status: DISCONTINUED | OUTPATIENT
Start: 2022-07-28 | End: 2022-07-28 | Stop reason: HOSPADM

## 2022-07-28 RX ORDER — DEXTROSE MONOHYDRATE 100 MG/ML
0-250 INJECTION, SOLUTION INTRAVENOUS AS NEEDED
Status: DISCONTINUED | OUTPATIENT
Start: 2022-07-28 | End: 2022-07-28 | Stop reason: HOSPADM

## 2022-07-28 RX ORDER — SODIUM CHLORIDE, SODIUM LACTATE, POTASSIUM CHLORIDE, CALCIUM CHLORIDE 600; 310; 30; 20 MG/100ML; MG/100ML; MG/100ML; MG/100ML
25 INJECTION, SOLUTION INTRAVENOUS CONTINUOUS
Status: DISCONTINUED | OUTPATIENT
Start: 2022-07-28 | End: 2022-07-28 | Stop reason: HOSPADM

## 2022-07-28 RX ORDER — PROPOFOL 10 MG/ML
INJECTION, EMULSION INTRAVENOUS AS NEEDED
Status: DISCONTINUED | OUTPATIENT
Start: 2022-07-28 | End: 2022-07-28 | Stop reason: HOSPADM

## 2022-07-28 RX ORDER — INSULIN LISPRO 100 [IU]/ML
INJECTION, SOLUTION INTRAVENOUS; SUBCUTANEOUS ONCE
Status: DISCONTINUED | OUTPATIENT
Start: 2022-07-28 | End: 2022-07-28 | Stop reason: HOSPADM

## 2022-07-28 RX ORDER — LIDOCAINE HYDROCHLORIDE 20 MG/ML
INJECTION, SOLUTION EPIDURAL; INFILTRATION; INTRACAUDAL; PERINEURAL AS NEEDED
Status: DISCONTINUED | OUTPATIENT
Start: 2022-07-28 | End: 2022-07-28 | Stop reason: HOSPADM

## 2022-07-28 RX ORDER — SODIUM CHLORIDE 0.9 % (FLUSH) 0.9 %
5-40 SYRINGE (ML) INJECTION EVERY 8 HOURS
Status: DISCONTINUED | OUTPATIENT
Start: 2022-07-28 | End: 2022-07-28 | Stop reason: HOSPADM

## 2022-07-28 RX ORDER — MAGNESIUM SULFATE 100 %
4 CRYSTALS MISCELLANEOUS AS NEEDED
Status: DISCONTINUED | OUTPATIENT
Start: 2022-07-28 | End: 2022-07-28 | Stop reason: HOSPADM

## 2022-07-28 RX ORDER — SODIUM CHLORIDE 0.9 % (FLUSH) 0.9 %
5-40 SYRINGE (ML) INJECTION AS NEEDED
Status: DISCONTINUED | OUTPATIENT
Start: 2022-07-28 | End: 2022-07-28 | Stop reason: HOSPADM

## 2022-07-28 RX ADMIN — PROPOFOL 20 MG: 10 INJECTION, EMULSION INTRAVENOUS at 09:31

## 2022-07-28 RX ADMIN — FAMOTIDINE 20 MG: 10 INJECTION, SOLUTION INTRAVENOUS at 09:09

## 2022-07-28 RX ADMIN — PROPOFOL 20 MG: 10 INJECTION, EMULSION INTRAVENOUS at 09:43

## 2022-07-28 RX ADMIN — PROPOFOL 20 MG: 10 INJECTION, EMULSION INTRAVENOUS at 09:25

## 2022-07-28 RX ADMIN — PROPOFOL 20 MG: 10 INJECTION, EMULSION INTRAVENOUS at 09:37

## 2022-07-28 RX ADMIN — PROPOFOL 20 MG: 10 INJECTION, EMULSION INTRAVENOUS at 09:35

## 2022-07-28 RX ADMIN — PROPOFOL 100 MG: 10 INJECTION, EMULSION INTRAVENOUS at 09:21

## 2022-07-28 RX ADMIN — PROPOFOL 20 MG: 10 INJECTION, EMULSION INTRAVENOUS at 09:27

## 2022-07-28 RX ADMIN — SODIUM CHLORIDE, POTASSIUM CHLORIDE, SODIUM LACTATE AND CALCIUM CHLORIDE 25 ML/HR: 600; 310; 30; 20 INJECTION, SOLUTION INTRAVENOUS at 09:09

## 2022-07-28 RX ADMIN — LIDOCAINE HYDROCHLORIDE 80 MG: 20 INJECTION, SOLUTION EPIDURAL; INFILTRATION; INTRACAUDAL; PERINEURAL at 09:22

## 2022-07-28 RX ADMIN — PROPOFOL 20 MG: 10 INJECTION, EMULSION INTRAVENOUS at 09:39

## 2022-07-28 RX ADMIN — PROPOFOL 20 MG: 10 INJECTION, EMULSION INTRAVENOUS at 09:29

## 2022-07-28 RX ADMIN — PROPOFOL 20 MG: 10 INJECTION, EMULSION INTRAVENOUS at 09:23

## 2022-07-28 NOTE — ANESTHESIA POSTPROCEDURE EVALUATION
Procedure(s):  UPPER ENDOSCOPY with bxs  COLONOSCOPY. MAC    Anesthesia Post Evaluation      Multimodal analgesia: multimodal analgesia used between 6 hours prior to anesthesia start to PACU discharge  Patient location during evaluation: bedside  Patient participation: complete - patient participated  Level of consciousness: awake  Pain score: 0  Pain management: adequate  Airway patency: patent  Anesthetic complications: no  Cardiovascular status: stable  Respiratory status: acceptable  Hydration status: acceptable  Post anesthesia nausea and vomiting:  controlled  Final Post Anesthesia Temperature Assessment:  Normothermia (36.0-37.5 degrees C)      INITIAL Post-op Vital signs:   Vitals Value Taken Time   /72 07/28/22 1022   Temp     Pulse 54 07/28/22 1022   Resp 18 07/28/22 1022   SpO2 96 % 07/28/22 1022   Vitals shown include unvalidated device data.

## 2022-07-28 NOTE — DISCHARGE INSTRUCTIONS
Upper GI Endoscopy: What to Expect at [address] had an upper GI endoscopy. Your doctor used a thin, lighted tube that bends to look at the inside of your esophagus, your stomach, and the first part of the small intestine, called the duodenum. After you have an endoscopy, you will stay at the hospital or clinic for 1 to 2 hours. This will allow the medicine to wear off. You will be able to go home after your doctor or nurse checks to make sure that you're not having any problems. You may have to stay overnight if you had treatment during the test. You may have a sore throat for a day or two after the test.  This care sheet gives you a general idea about what to expect after the test.  How can you care for yourself at home? Activity   Rest as much as you need to after you go home. You should be able to go back to your usual activities the day after the test.  Diet   Follow your doctor's directions for eating after the test.  Drink plenty of fluids (unless your doctor has told you not to). Medications   If you have a sore throat the day after the test, use an over-the-counter spray to numb your throat. Follow-up care is a key part of your treatment and safety. Be sure to make and go to all appointments, and call your doctor if you are having problems. It's also a good idea to know your test results and keep a list of the medicines you take. When should you call for help? Call 911 anytime you think you may need emergency care. For example, call if:    You passed out (lost consciousness). You have trouble breathing. You pass maroon or bloody stools. Call your doctor now or seek immediate medical care if:    You have pain that does not get better after your take pain medicine. You have new or worse belly pain. You have blood in your stools. You are sick to your stomach and cannot keep fluids down. You have a fever. You cannot pass stools or gas.    Watch closely for changes in your health, and be sure to contact your doctor if:    Your throat still hurts after a day or two. You do not get better as expected. Where can you learn more? Go to http://umesh-charan.info/  Enter J454 in the search box to learn more about \"Upper GI Endoscopy: What to Expect at Home. \"  Current as of: September 8, 2021               Content Version: 13.2  © 2006-2022 [employer]. Care instructions adapted under license by [employer] (which disclaims liability or warranty for this information). If you have questions about a medical condition or this instruction, always ask your healthcare professional. [place] [address] any warranty or liability for your use of this information. Colonoscopy: What to Expect at [address] [place] [address]  After a colonoscopy, you'll stay at the clinic until you wake up. Then you can go home. But you'll need to arrange for a ride. Your doctor will tell you when you can eat and do your other usual activities. Your doctor will talk to you about when you'll need your next colonoscopy. Your doctor can help you decide how often you need to be checked. This will depend on the results of your test and your risk for colorectal cancer. After the test, you may be bloated or have gas pains. You may need to pass gas. If a biopsy was done or a polyp was removed, you may have streaks of blood in your stool (feces) for a few days. Problems such as heavy rectal bleeding may not occur until several weeks after the test. This isn't common. But it can happen after polyps are removed. This care sheet gives you a general idea about how long it will take for you to recover. But each person recovers at a different pace. Follow the steps below to get better as quickly as possible. How can you care for yourself at home? Activity    Rest when you feel tired. You can do your normal activities when it feels okay to do so. Diet    Follow your doctor's directions for eating. Unless your doctor has told you not to, drink plenty of fluids. This helps to replace the fluids that were lost during the colon prep. Do not drink alcohol. Medicines    Your doctor will tell you if and when you can restart your medicines. You will also be given instructions about taking any new medicines. If you take aspirin or some other blood thinner, ask your doctor if and when to start taking it again. Make sure that you understand exactly what your doctor wants you to do. If polyps were removed or a biopsy was done during the test, your doctor may tell you not to take aspirin or other anti-inflammatory medicines for a few days. These include ibuprofen (Advil, Motrin) and naproxen (Aleve). Other instructions    For your safety, do not drive or operate machinery until the medicine wears off and you can think clearly. Your doctor may tell you not to drive or operate machinery until the day after your test.     Do not sign legal documents or make major decisions until the medicine wears off and you can think clearly. The anesthesia can make it hard for you to fully understand what you are agreeing to. Follow-up care is a key part of your treatment and safety. Be sure to make and go to all appointments, and call your doctor if you are having problems. It's also a good idea to know your test results and keep a list of the medicines you take. When should you call for help? Call 911 anytime you think you may need emergency care. For example, call if:    You passed out (lost consciousness). You pass maroon or bloody stools. You have trouble breathing. Call your doctor now or seek immediate medical care if:    You have pain that does not get better after you take pain medicine. You are sick to your stomach or cannot drink fluids. You have new or worse belly pain. You have blood in your stools. You have a fever. You cannot pass stools or gas. Watch closely for changes in your health, and be sure to contact your doctor if you have any problems. Where can you learn more? Go to http://www.gray.com/  Enter E264 in the search box to learn more about \"Colonoscopy: What to Expect at Home. \"  Current as of: September 8, 2021               Content Version: 13.2  © 2006-2022 [employer]. Care instructions adapted under license by [employer] (which disclaims liability or warranty for this information). If you have questions about a medical condition or this instruction, always ask your healthcare professional. [place] [address] any warranty or liability for your use of this information. DISCHARGE SUMMARY from Nurse     POST-PROCEDURE INSTRUCTIONS:    Call your Physician if you:  Observe any excess bleeding. Develop a temperature over 100.5o F. Experience abdominal, shoulder or chest pain. Notice any signs of decreased circulation or nerve impairment to an extremity such as a change in color, persistent numbness, tingling, coldness or increase in pain. Vomit blood or you have nausea and vomiting lasting longer than 4 hours. Are unable to take medications. Are unable to urinate within 8 hours after discharge following general anesthesia or intravenous sedation. For the next 24 hours after receiving general anesthesia or intravenous sedation, or while taking prescription Narcotics, limit your activities:  Do NOT drive a motor vehicle, operate hazard machinery or power tools, or perform tasks that require coordination. The medication you received during your procedure may have some effect on your mental awareness. Do NOT make important personal or business decisions. The medication you received during your procedure may have some effect on your mental awareness. Do NOT drink alcoholic beverages.   These drinks do not mix well with the medications that have been given to you. Upon discharge from the hospital, you must be accompanied by a responsible adult. Resume your diet as directed by your physician. Resume medications as your physician has prescribed. Please give a list of your current medications to your Primary Care Provider. Please update this list whenever your medications are discontinued, doses are changed, or new medications (including over-the-counter products) are added. Please carry medication information at all times in case of emergency situations. These are general instructions for a healthy lifestyle:    No smoking/ No tobacco products/ Avoid exposure to second hand smoke. Surgeon General's Warning:  Quitting smoking now greatly reduces serious risk to your health. Obesity, smoking, and a sedentary lifestyle greatly increase your risk for illness. A healthy diet, regular physical exercise & weight monitoring are important for maintaining a healthy lifestyle  You may be retaining fluid if you have a history of heart failure or if you experience any of the following symptoms:  Weight gain of 3 pounds or more overnight or 5 pounds in a week, increased swelling in our hands or feet or shortness of breath while lying flat in bed. Please call your doctor as soon as you notice any of these symptoms; do not wait until your next office visit. Recognize signs and symptoms of STROKE:  F  -  Face looks uneven  A  -  Arms unable to move or move unevenly  S  -  Speech slurred or non-existent  T  -  Time to call 911 - as soon as signs and symptoms begin - DO NOT go back to bed or wait to see If you get better - TIME IS BRAIN. Colorectal Screening  Colorectal cancer almost always develops from precancerous polyps (abnormal growths) in the colon or rectum. Screening tests can find precancerous polyps, so that they can be removed before they turn into cancer.  Screening tests can also find colorectal cancer early, when treatment works best.  Speak with your physician about when you should begin screening and how often you should be tested. "[facility]hart Activation    Thank you for requesting access to [employer]Serv. Please follow the instructions below to securely access and download your online medical record. SetuServ allows you to send messages to your doctor, view your test results, renew your prescriptions, schedule appointments, and more. How Do I Sign Up? In your internet browser, go to [email or web address]. Click on the First Time User? Click Here link in the Sign In box. You will see the New Member Sign Up page. Enter your [email or web address] Access Code exactly as it appears below. You will not need to use this code after youve completed the sign-up process. If you do not sign up before the expiration date, you must request a new code. [email or web address] Access Code: Activation code not generated  Current SetuServ Status: Active (This is the date your SetuServ access code will )    Enter the last four digits of your Social Security Number (xxxx) and Date of Birth (mm/dd/yyyy) as indicated and click Submit. You will be taken to the next sign-up page. Create a [email or web address] ID. This will be your SetuServ login ID and cannot be changed, so think of one that is secure and easy to remember. Create a SetuServ password. You can change your password at any time. Enter your Password Reset Question and Answer. This can be used at a later time if you forget your password. Enter your e-mail address. You will receive e-mail notification when new information is available in [address] Click Sign Up. You can now view and download portions of your medical record. Click the Floxx link to download a portable copy of your medical information. Additional Information    If you have questions, please call [phone]. Remember, SetuServ is NOT to be used for urgent needs. For medical emergencies, dial 911.     Educational references and/or instructions provided during this visit included:    See Attached      APPOINTMENTS:    Per MD Instruction    Discharge information has been reviewed with the patient. The patient verbalized understanding. Tompkins's Esophagus: Care Instructions  Your Care Instructions     The esophagus is the tube that connects the throat to the stomach. Food and liquids go through this tube. In Tompkins's esophagus, the cells that line the tube change. This is usually because of gastroesophageal reflux disease (GERD). GERD causes acid from your stomach to back up into the esophagus. When you have Tompkins's esophagus, you are slightly more likely to get cancer of the esophagus. So regular testing is important to watch for signs of this cancer. You can treat GERD to control your symptoms and feel better. Follow-up care is a key part of your treatment and safety. Be sure to make and go to all appointments, and call your doctor if you are having problems. It's also a good idea to know your test results and keep a list of the medicines you take. How can you care for yourself at home? Take your medicines exactly as prescribed. Call your doctor if you think you are having a problem with your medicine. If you take over-the-counter medicine, such as antacids or acid reducers, follow all instructions on the label. If you use these medicines often, talk with your doctor. Be careful when you take over-the-counter antacid medicines. Many of these medicines have aspirin in them. Read the label to make sure that you are not taking more than the recommended dose. Too much aspirin can be harmful. Do not smoke or chew tobacco. Smoking can make GERD worse. If you need help quitting, talk to your doctor about stop-smoking programs and medicines. These can increase your chances of quitting for good. Avoid foods that make your symptoms worse.  These may include chocolate, mint, alcohol, pepper, spicy foods, high-fat foods, or drinks with caffeine in them, such as tea, coffee, rajiv, or energy drinks. If your symptoms are worse after you eat a certain food, you may want to stop eating it to see if your symptoms get better. Eat smaller meals, and more often. After eating, wait 2 to 3 hours before you lie down. Raise the head of your bed 6 to 8 inches by putting blocks under the frame or a foam wedge under the head of the mattress. Do not wear tight clothing around your midsection. If you are overweight, lose weight. Even losing 5 to 10 pounds can help. When should you call for help? Call your doctor now or seek immediate medical care if:    You have new or worse belly pain. You are vomiting. Watch closely for changes in your health, and be sure to contact your doctor if:    You have any pain or difficulty swallowing. You are losing weight. You have new or worse symptoms, such as heartburn. You do not get better as expected. Where can you learn more? Go to http://www.gray.com/  Enter L146 in the search box to learn more about \"Tompkins's Esophagus: Care Instructions. \"  Current as of: September 8, 2021               Content Version: 13.2  © 8450-4779 [employer]. Care instructions adapted under license by [employer] (which disclaims liability or warranty for this information). If you have questions about a medical condition or this instruction, always ask your healthcare professional. [place] [address] any warranty or liability for your use of this information. Hemorrhoids: Care Instructions  Overview     Hemorrhoids are swollen veins that develop in the anal canal. Bleeding during bowel movements, itching, and rectal pain are the most common symptoms. Hemorrhoids can be uncomfortable at times, but rarely are they a serious problem. Most of the time, you can treat them with simple changes to your diet and bowel habits.  These changes include eating more fiber and not straining to pass stools. Most hemorrhoids don't need surgery or other treatment unless they are very large and painful or bleed a lot. Follow-up care is a key part of your treatment and safety. Be sure to make and go to all appointments, and call your doctor if you are having problems. It's also a good idea to know your test results and keep a list of the medicines you take. How can you care for yourself at home? Sit in a few inches of warm water (sitz bath) 3 times a day and after bowel movements. The warm water helps with pain and itching. Put ice on your anal area several times a day for 10 minutes at a time. Put a thin cloth between the ice and your skin. Follow this by placing a warm, wet towel on the area for another 10 to 20 minutes. Take pain medicines exactly as directed. If the doctor gave you a prescription medicine for pain, take it as prescribed. If you are not taking a prescription pain medicine, ask your doctor if you can take an over-the-counter medicine. Keep the anal area clean, but be gentle. Use water and a fragrance-free soap, or use baby wipes or medicated pads such as Tucks. Wear cotton underwear and loose clothing to decrease moisture in the anal area. Eat more fiber. Include foods such as whole-grain breads and cereals, raw vegetables, raw and dried fruits, and beans. Drink plenty of fluids. If you have kidney, heart, or liver disease and have to limit fluids, talk with your doctor before you increase the amount of fluids you drink. Use a stool softener that contains bran or psyllium. You can save money by buying bran or psyllium (available in bulk at most health food stores) and sprinkling it on foods or stirring it into fruit juice. Or you can use a product such as Metamucil or Hydrocil. Practice healthy bowel habits. Go to the bathroom as soon as you have the urge. Avoid straining to pass stools.  Relax and give yourself time to let things happen naturally. Do not hold your breath while passing stools. Do not read while sitting on the toilet. Get off the toilet as soon as you have finished. Take your medicines exactly as prescribed. Call your doctor if you think you are having a problem with your medicine. When should you call for help? Call 911 anytime you think you may need emergency care. For example, call if:    You pass maroon or very bloody stools. Call your doctor now or seek immediate medical care if:    You have increased pain. You have increased bleeding. Watch closely for changes in your health, and be sure to contact your doctor if:    Your symptoms have not improved after 3 or 4 days. Where can you learn more? Go to http://www.coleman.com/  Enter F228 in the search box to learn more about \"Hemorrhoids: Care Instructions. \"  Current as of: September 8, 2021               Content Version: 13.2  © 2006-2022 [employer]. Care instructions adapted under license by [employer] (which disclaims liability or warranty for this information). If you have questions about a medical condition or this instruction, always ask your healthcare professional. [place] [address] any warranty or liability for your use of this information. Diverticulosis: Care Instructions  Your Care Instructions  In diverticulosis, pouches called diverticula form in the wall of the large intestine (colon). The pouches do not cause any pain or other symptoms. Most people who have diverticulosis do not know they have it. But the pouches sometimes bleed, and if they become infected, they can cause pain and other symptoms. When this happens, it is called diverticulitis. Diverticula form when pressure pushes the wall of the colon outward at certain weak points. A diet that is too low in fiber can cause diverticula. Follow-up care is a key part of your treatment and safety.  Be sure to make and go to all appointments, and call your doctor if you are having problems. It's also a good idea to know your test results and keep a list of the medicines you take. How can you care for yourself at home? Include fruits, leafy green vegetables, beans, and whole grains in your diet each day. These foods are high in fiber. Take a fiber supplement, such as Citrucel or Metamucil, every day if needed. Read and follow all instructions on the label. Drink plenty of fluids. If you have kidney, heart, or liver disease and have to limit fluids, talk with your doctor before you increase the amount of fluids you drink. Get at least 30 minutes of exercise on most days of the week. Walking is a good choice. You also may want to do other activities, such as running, swimming, cycling, or playing tennis or team sports. Cut out foods that cause gas, pain, or other symptoms. When should you call for help? Call your doctor now or seek immediate medical care if:    You have belly pain. You pass maroon or very bloody stools. You have a fever. You have nausea and vomiting. You have unusual changes in your bowel movements or abdominal swelling. You have burning pain when you urinate. You have abnormal vaginal discharge. You have shoulder pain. You have cramping pain that does not get better when you have a bowel movement or pass gas. You pass gas or stool from your urethra while urinating. Watch closely for changes in your health, and be sure to contact your doctor if you have any problems. Where can you learn more? Go to http://www.gray.com/  Enter [id number] in the search box to learn more about \"Diverticulosis: Care Instructions. \"  Current as of: September 8, 2021               Content Version: 13.2  © 4008-4481 [employer].    Care instructions adapted under license by "[employer] (which disclaims liability or warranty for this information). If you have questions about a medical condition or this instruction, always ask your healthcare professional. [place] [address] any warranty or liability for your use of this information.

## 2022-07-28 NOTE — H&P
[email or web address]  [phone]    GASTROENTEROLOGY Pre-Procedure H and P      Impression/Plan:   1.  This patient is consented for an EGD and colonoscopy for History of Tompkins's esophagus, abdominal pain and colon cancer screening       Chief Complaint: History of Tompkins's esophagus, abdominal pain and colon cancer screening    HPI:  Loyda Sloan is a 69 y.o. male who is being is having an EGD and colonoscopy for History of Tompkins's esophagus, abdominal pain and colon cancer screening  PMH:   Past Medical History:   Diagnosis Date    Arthritis     Asthma     Tompkins esophagus     Chronic obstructive pulmonary disease (HCC)     DM (di[facility] mellitus) ([facility]     Gout     H/O kidney removal     Left    Hernia, umbilical     HTN (hypertension)     Hypercholesteremia     Neuropathy     Poor circulation        PSH:   Past Surgical History:   Procedure Laterality Date    HX HERNIA REPAIR      HX NEPHRECTOMY Left 1971    HX RHINOPLASTY         Social HX:   Social History     Socioeconomic History    Marital status:      Spouse name: Not on file    Number of children: Not on file    Years of education: Not on file    Highest education level: Not on file   Occupational History    Not on file   Tobacco Use    Smoking status: Former     Packs/day: 2.50     Years: 34.00     Pack years: 85.00     Types: Cigarettes     Quit date: 2002     Years since quittin.0    Smokeless tobacco: Former   Vaping Use    Vaping Use: Never used   Substance and Sexual Activity    Alcohol use: Not Currently     Comment: Quit     Drug use: Never    Sexual activity: Not on file   Other Topics Concern    Not on file   Social History Narrative    Not on file     Social Determinants of Health     Financial Resource Strain: Not on file   Food Insecurity: Not on file   Transportation Needs: Not on file   Physical Activity: Not on file   Stress: Not on file   Social Connections: Not on file   Intimate Partner Violence: Not on file Housing Stability: Not on file       FHX:   History reviewed. No pertinent family history. Allergy:   Allergies   Allergen Reactions    Celebrex [Celecoxib] Myalgia    Other Medication Unknown (comments)     Patient says he is allergic to different medications but is not sure what they are. Home Medications:     Medications Prior to Admission   Medication Sig    ascorbic acid, vitamin C, (Vitamin C) 500 mg tablet Take 1,000 mg by mouth in the morning. cholecalciferol (VITAMIN D3) (2,000 UNITS /50 MCG) cap capsule Take 2,000 Units by mouth in the morning. COQ10, UBIQUINOL, PO Take 400 mg by mouth daily. sertraline (ZOLOFT) 100 mg tablet Take 100 mg by mouth daily. fluticasone-umeclidin-vilanter (Trelegy Ellipta) 200-62.5-25 mcg inhaler Take 1 Puff by inhalation daily. albuterol (PROVENTIL HFA, VENTOLIN HFA, PROAIR HFA) 90 mcg/actuation inhaler Take 1 Puff by inhalation every six (6) hours as needed for Wheezing. allopurinoL (ZYLOPRIM) 100 mg tablet take 1 tablet by mouth twice a day (Patient taking differently: Take 100 mg by mouth in the morning.)    tamsulosin (FLOMAX) 0.4 mg capsule Take 1 Capsule by mouth daily. finasteride (PROSCAR) 5 mg tablet Take 1 Tablet by mouth daily. colchicine 0.6 mg tablet Take 1 Tablet by mouth two (2) times a day. (Patient taking differently: Take 0.6 mg by mouth in the morning. Indications: acute inflammation of the joints due to gout attack)    labetaloL (NORMODYNE) 300 mg tablet Take 1 Tablet by mouth two (2) times a day. (Patient taking differently: Take 300 mg by mouth in the morning.)    rosuvastatin (CRESTOR) 5 mg tablet Take 5 mg by mouth in the morning. Review of Systems:     Constitutional: No fevers, chills, weight loss, fatigue. Skin: No rashes, pruritis, jaundice, ulcerations, erythema. HENT: No headaches, nosebleeds, sinus pressure, rhinorrhea, sore throat.    Eyes: No visual changes, blurred vision, eye pain, photophobia, jaundice. Cardiovascular: No chest pain, heart palpitations. Respiratory: No cough, SOB, wheezing, chest discomfort, orthopnea. Gastrointestinal: Neg unless noted otherwise in H&P   Genitourinary: No dysuria, bleeding, discharge, pyuria. Musculoskeletal: No weakness, arthralgias, wasting. Endo: No sweats. Heme: No bruising, easy bleeding. Allergies: As noted. Neurological: Cranial nerves intact. Alert and oriented. Gait not assessed. Psychiatric:  No anxiety, depression, hallucinations. Visit Vitals  Ht 5' 9\" (1.753 m)   Wt 99.8 kg (220 lb)   BMI 32.49 kg/m²       Physical Assessment:     constitutional: appearance: well developed, well nourished, normal habitus, no deformities, in no acute distress. skin: inspection: no rashes, ulcers, icterus or other lesions; no clubbing or telangiectasias. palpation: no induration or subcutaneos nodules. eyes: inspection: normal conjunctivae and lids; no jaundice pupils: normal  ENMT: mouth: normal oral mucosa,lips and gums; good dentition. oropharynx: normal tongue, hard and soft palate; posterior pharynx without erithema, exudate or lesions. neck: thyroid: normal size, consistency and position; no masses or tenderness. respiratory: effort: normal chest excursion; no intercostal retraction or accessory muscle use. cardiovascular: abdominal aorta: normal size and position; no bruits. palpation: PMI of normal size and position; normal rhythm; no thrill or murmurs. abdominal: abdomen: normal consistency; no tenderness or masses. hernias: no hernias appreciated. liver: normal size and consistency. spleen: not palpable. rectal: hemoccult/guaiac: not performed. musculoskeletal: digits and nails: no clubbing, cyanosis, petechiae or other inflammatory conditions. gait: normal gait and station head and neck: normal range of motion; no pain, crepitation or contracture.  spine/ribs/pelvis: normal range of motion; no pain, deformity or contracture. neurologic: cranial nerves: II-XII normal.   psychiatric: judgement/insight: within normal limits. memory: within normal limits for recent and remote events. mood and affect: no evidence of depression, anxiety or agitation. orientation: oriented to time, space and person. Basic Metabolic Profile   No results for input(s): NA, K, CL, CO2, BUN, GLU, CA, MG, PHOS in the last 72 hours. No lab exists for component: CREAT      CBC w/Diff    No results for input(s): WBC, RBC, HGB, HCT, MCV, MCH, MCHC, RDW, PLT, HGBEXT, HCTEXT, PLTEXT in the last 72 hours. No lab exists for component: MPV No results for input(s): GRANS, LYMPH, EOS, PRO, MYELO, METAS, BLAST in the last 72 hours. No lab exists for component: MONO, BASO     Hepatic Function   No results for input(s): ALB, TP, TBILI, AP, AML, LPSE in the last 72 hours. No lab exists for component: DBILI, GPT, SGOT     Coags   No results for input(s): PTP, INR, APTT, INREXT in the last 72 hours. Murray Trivedi, [address] [place] [address]  [facility], [address] [place] [address]  [email or web address]

## 2022-07-28 NOTE — PROGRESS NOTES
[email or web address] [address]  [place], [address]      Brief Procedure Note    Ousmane Moran  1945  [id number]    Date of Procedure: 7/28/2022    Preoperative diagnosis: BMI 32.0 - 32.9, adult:  Z68.32  Abdominal pain:  R10.9  Bloating symptom:  787.3 - R14.0  Constipation:  564.00 - K59.00  Tompkins's esophagus:  530.85 - K22.70  Colon cancer Screening:  V76.51 - H62.79  Hernia, umbilical:  623.5 - S46.9  Hernia, ventral:  553.20 - K43.9    Postoperative diagnosis: ENDO: Hiatal hernia, C0M1, Tompkins's esophagus, dudenum nodule bxs, esophagus bxs  COLO: Diverticulosis, hemorrhoids    Type of Anesthesia: MAC (Monitored anesthesia care)    Description of findings: same as post op dx    Procedure: Procedure(s):  UPPER ENDOSCOPY with bxs  COLONOSCOPY    :  Dr. Ama Cardoso MD    Assistant(s): Endoscopy Technician-1: Fani Sim Staff: Yessica Turner RN    Devices/implants/grafts/tissues/prosthesis: None    EBL:None    Specimens:   ID Type Source Tests Collected by Time Destination   1 : duodenal nodule bxs Preservative   Amie Boland MD 7/28/2022 0142 Pathology   2 : esophagus bxs Preservative Esophagus  Amie Boland MD 7/28/2022 8662 Pathology   3 : inlet patch bxs Preservative Esophagus  Amie Boland MD 7/28/2022 0930 Pathology       Findings: See printed and scanned procedure note    Complications: None    Dr. Ama Cardoso MD  7/28/2022  9:55 AM

## 2022-07-28 NOTE — ANESTHESIA PREPROCEDURE EVALUATION
Relevant Problems   No relevant active problems       Anesthetic History   No history of anesthetic complications            Review of Systems / Medical History  Patient summary reviewed and pertinent labs reviewed    Pulmonary    COPD: moderate        Asthma : well controlled       Neuro/Psych   Within defined limits           Cardiovascular    Hypertension: well controlled              Exercise tolerance: >4 METS     GI/Hepatic/Renal               Comments: Barretts esophagus Endo/Other    Diabetes: well controlled, type 2    Obesity and arthritis     Other Findings            Physical Exam    Airway  Mallampati: II  TM Distance: 4 - 6 cm  Neck ROM: normal range of motion   Mouth opening: Normal     Cardiovascular  Regular rate and rhythm,  S1 and S2 normal,  no murmur, click, rub, or gallop             Dental  No notable dental hx       Pulmonary  Breath sounds clear to auscultation               Abdominal  GI exam deferred       Other Findings            Anesthetic Plan    ASA: 3  Anesthesia type: MAC          Induction: Intravenous  Anesthetic plan and risks discussed with: Patient

## 2022-08-01 ENCOUNTER — HOSPITAL ENCOUNTER (OUTPATIENT)
Dept: LAB | Age: 77
Discharge: HOME | End: 2022-08-01
Payer: MEDICARE

## 2022-08-01 DIAGNOSIS — Z87.891 EX-SMOKER: ICD-10-CM

## 2022-08-01 DIAGNOSIS — Z90.5 HISTORY OF NEPHRECTOMY: ICD-10-CM

## 2022-08-01 DIAGNOSIS — Z86.16 HISTORY OF COVID-19: ICD-10-CM

## 2022-08-01 DIAGNOSIS — R93.89 ABNORMAL CXR: ICD-10-CM

## 2022-08-01 DIAGNOSIS — M25.50 ARTHRALGIA, UNSPECIFIED JOINT: ICD-10-CM

## 2022-08-01 DIAGNOSIS — R06.09 DYSPNEA ON EXERTION: ICD-10-CM

## 2022-08-01 DIAGNOSIS — R06.2 WHEEZING: ICD-10-CM

## 2022-08-01 DIAGNOSIS — E11.8 CONTROLLED TYPE 2 DIABETES MELLITUS WITH COMPLICATION, WITHOUT LONG-TERM CURRENT USE OF INSULIN (HCC): ICD-10-CM

## 2022-08-01 LAB
BASOPHILS # BLD: 0.1 K/UL (ref 0–0.1)
BASOPHILS NFR BLD: 1 % (ref 0–2)
DIFFERENTIAL METHOD BLD: ABNORMAL
EOSINOPHIL # BLD: 0.3 K/UL (ref 0–0.4)
EOSINOPHIL NFR BLD: 5 % (ref 0–5)
ERYTHROCYTE [DISTWIDTH] IN BLOOD BY AUTOMATED COUNT: 13 % (ref 11.6–14.5)
HCT VFR BLD AUTO: 30.6 % (ref 36–48)
HGB BLD-MCNC: 10.1 G/DL (ref 13–16)
IMM GRANULOCYTES # BLD AUTO: 0 K/UL (ref 0–0.04)
IMM GRANULOCYTES NFR BLD AUTO: 0 % (ref 0–0.5)
LYMPHOCYTES # BLD: 1.1 K/UL (ref 0.9–3.6)
LYMPHOCYTES NFR BLD: 19 % (ref 21–52)
MCH RBC QN AUTO: 29.6 PG (ref 24–34)
MCHC RBC AUTO-ENTMCNC: 33 G/DL (ref 31–37)
MCV RBC AUTO: 89.7 FL (ref 78–100)
MONOCYTES # BLD: 0.4 K/UL (ref 0.05–1.2)
MONOCYTES NFR BLD: 7 % (ref 3–10)
NEUTS SEG # BLD: 3.8 K/UL (ref 1.8–8)
NEUTS SEG NFR BLD: 68 % (ref 40–73)
NRBC # BLD: 0 K/UL (ref 0–0.01)
NRBC BLD-RTO: 0 PER 100 WBC
PLATELET # BLD AUTO: 198 K/UL (ref 135–420)
PMV BLD AUTO: 10.7 FL (ref 9.2–11.8)
RBC # BLD AUTO: 3.41 M/UL (ref 4.35–5.65)
RHEUMATOID FACT SERPL-ACNC: <10 IU/ML
WBC # BLD AUTO: 5.6 K/UL (ref 4.6–13.2)

## 2022-08-01 PROCEDURE — 82785 ASSAY OF IGE: CPT

## 2022-08-01 PROCEDURE — 86225 DNA ANTIBODY NATIVE: CPT

## 2022-08-01 PROCEDURE — 85025 COMPLETE CBC W/AUTO DIFF WBC: CPT

## 2022-08-01 PROCEDURE — 86431 RHEUMATOID FACTOR QUANT: CPT

## 2022-08-02 LAB
CENTROMERE B AB SER-ACNC: <0.2 AI (ref 0–0.9)
CHROMATIN AB SERPL-ACNC: <0.2 AI (ref 0–0.9)
DSDNA AB SER-ACNC: 1 IU/ML (ref 0–9)
ENA JO1 AB SER-ACNC: <0.2 AI (ref 0–0.9)
ENA RNP AB SER-ACNC: <0.2 AI (ref 0–0.9)
ENA SCL70 AB SER-ACNC: <0.2 AI (ref 0–0.9)
ENA SM AB SER-ACNC: <0.2 AI (ref 0–0.9)
ENA SM+RNP AB SER-ACNC: <0.2 AI (ref 0–0.9)
ENA SS-A AB SER-ACNC: <0.2 AI (ref 0–0.9)
ENA SS-B AB SER-ACNC: <0.2 AI (ref 0–0.9)
RIBOSOMAL P AB SER-ACNC: <0.2 AI (ref 0–0.9)
SEE BELOW:, 164879: NORMAL

## 2022-08-04 LAB — IGE SERPL-ACNC: 26 IU/ML (ref 6–495)

## 2022-08-10 ENCOUNTER — OFFICE VISIT (OUTPATIENT)
Dept: PULMONOLOGY | Age: 77
End: 2022-08-10
Payer: MEDICARE

## 2022-08-10 VITALS
HEIGHT: 69 IN | RESPIRATION RATE: 18 BRPM | OXYGEN SATURATION: 95 % | WEIGHT: 220.2 LBS | DIASTOLIC BLOOD PRESSURE: 78 MMHG | SYSTOLIC BLOOD PRESSURE: 150 MMHG | TEMPERATURE: 97.6 F | BODY MASS INDEX: 32.61 KG/M2 | HEART RATE: 63 BPM

## 2022-08-10 DIAGNOSIS — Z86.16 HISTORY OF COVID-19: ICD-10-CM

## 2022-08-10 DIAGNOSIS — J44.9 CHRONIC OBSTRUCTIVE PULMONARY DISEASE, UNSPECIFIED COPD TYPE (HCC): Primary | ICD-10-CM

## 2022-08-10 DIAGNOSIS — R06.00 DYSPNEA, UNSPECIFIED TYPE: ICD-10-CM

## 2022-08-10 DIAGNOSIS — R91.8 PULMONARY NODULES: ICD-10-CM

## 2022-08-10 DIAGNOSIS — Z87.891 EX-SMOKER: ICD-10-CM

## 2022-08-10 DIAGNOSIS — D72.19 PERIPHERAL EOSINOPHILIA: ICD-10-CM

## 2022-08-10 DIAGNOSIS — N18.32 STAGE 3B CHRONIC KIDNEY DISEASE (HCC): ICD-10-CM

## 2022-08-10 DIAGNOSIS — R06.09 DYSPNEA ON EXERTION: ICD-10-CM

## 2022-08-10 DIAGNOSIS — Z23 ENCOUNTER FOR IMMUNIZATION: ICD-10-CM

## 2022-08-10 PROCEDURE — G0009 ADMIN PNEUMOCOCCAL VACCINE: HCPCS | Performed by: INTERNAL MEDICINE

## 2022-08-10 PROCEDURE — G8754 DIAS BP LESS 90: HCPCS | Performed by: INTERNAL MEDICINE

## 2022-08-10 PROCEDURE — 99215 OFFICE O/P EST HI 40 MIN: CPT | Performed by: INTERNAL MEDICINE

## 2022-08-10 PROCEDURE — G8510 SCR DEP NEG, NO PLAN REQD: HCPCS | Performed by: INTERNAL MEDICINE

## 2022-08-10 PROCEDURE — G8753 SYS BP > OR = 140: HCPCS | Performed by: INTERNAL MEDICINE

## 2022-08-10 PROCEDURE — 1101F PT FALLS ASSESS-DOCD LE1/YR: CPT | Performed by: INTERNAL MEDICINE

## 2022-08-10 PROCEDURE — G8417 CALC BMI ABV UP PARAM F/U: HCPCS | Performed by: INTERNAL MEDICINE

## 2022-08-10 PROCEDURE — G8536 NO DOC ELDER MAL SCRN: HCPCS | Performed by: INTERNAL MEDICINE

## 2022-08-10 PROCEDURE — G8427 DOCREV CUR MEDS BY ELIG CLIN: HCPCS | Performed by: INTERNAL MEDICINE

## 2022-08-10 PROCEDURE — 1123F ACP DISCUSS/DSCN MKR DOCD: CPT | Performed by: INTERNAL MEDICINE

## 2022-08-10 PROCEDURE — 90732 PPSV23 VACC 2 YRS+ SUBQ/IM: CPT | Performed by: INTERNAL MEDICINE

## 2022-08-10 NOTE — LETTER
8/11/2022    Patient: Emely Stokes   YOB: 1945   Date of Visit: 8/10/2022     Mila Slade MD  [address] [address]  Via Fax: [phone]    Dear Mila Slade MD,      Thank you for referring Mr. Emely Stokes to [address] [place] [address] for evaluation. My notes for this consultation are attached. If you have questions, please do not hesitate to call me. I look forward to following your patient along with you.       Sincerely,    Ailin Norris, DO

## 2022-08-10 NOTE — PROGRESS NOTES
[facility]  Pulmonary, Critical Care, and Sleep Medicine    Office Progress Note- Initial Evaluation      Primary Care Physician: Maverick Kaye MD     Reason for Visit:  Evaluation for Dyspnea    Assessment:  Abnormal CXR: GGO's noted on CXR Jan 2022. Patient COVID+ at that time. Resolved on follow up imaging 5/19/22  Pulmonary Nodules: Noted on CT May 2022- Needs serial imaging  COPD: Non-reversible obstruction on PFTs. Patient had difficulty with testing. He appears to have reduced air movement on today's exam. No rhonchi, crackles or wheezing. He does not appear to have an acute exacerbation, Improved on Trelegy Patient denies a history of asthma. He does have a high eosinophil count which raises the question for possible asthma- IgE not significantly elevated- monitor for now  Dyspnea: Recent cardiac evaluation unremarkable, No oxygen requirement - stable  Fatigue/EDS: Improved  Arthralgias  Gout  Diabetes  CKD, S/P nephrectomy- distant- unclear why: Stage 3b based on GFR: 1/13/22  Hypertension- BP is elevated today in clinic. He is asymptomatic. He does not consistently take his BP medicaton  Barrettes Esophagus- pending EGD  Resting/ Essential tremor  Depression: On Zoloft, Wife passes away in January 2022  Ex-smoker: 85 pack year  Anemia: possibly related to CKD, no reports of acute bleeding: f/U PCP  Obesity: Body mass index is 32.52 kg/m². Plan:  I  have encouraged the patient to get natural morning light and to try and find an activity to do. Back pain limits his mobility- this may be better optimized with mobility aids and PT. I will hold on Pulmonary Rehab.   I have asked that the patient discuss mobility aids with PCP    Pneumococcal vaccination today- 23 valiant  Continue Trelegy- patient to call our clinic if he has difficulties getting long acting inhalers  Continue PRN albuterol  Monitor EMMA/LABA effect on patient's resting tremor- I dicussed this with the patient and his daughter. Check  Allergy Zone 2 Panel- was not obtained by lab at last draw- I have discussed with patient who is agreeable to get. Want to assess for any allergic component. Continue to monitor eosinophil count  Avoid Respiratory Triggers as best possible  Reassess utility for Pulm Rehab at next visit    Obtain Non-contrast CT scan follow up in May 2023 for pulmonary nodules  Future sleep testing- patient desires not to pursue at this time. Will continue to monitor  Continue with smoking cessation  Follow up with Primary Care Provider (PCP) as directed and for, evaluation for walker and possible PT, possibly sub-optimized depression,   routine health care maintenance. Follow-up: 10 months- after repeat CT Chest, sooner should new symptoms or problems arise. History of Present Illness: Mr. Gita Anthony is a 69 y.o. male patient who presents for evaluation for dyspnea. The history was provided by the patient, and one of his daughter. He has another daughter who lives with the patient. The patient is an ex-smoker. He was born and previously living in [place]. He moved down to the [facility] in the Fall of 2021. Per the patient's daughter, the patient was having a difficult time getting medication for the patient and his ill wife. The patient had been caring for his wife, who passed away in 2022. He is currently living in an apartment with another daughter next door. The patient reports that he has been experiencing increasing dyspnea on exertion ever since he got his COVID vaccination(s). In January 2022, the patient did indeed contract COVID-19. He went to [facility] ED but was discharged home. He was reports that he was not treated with steroids or other adjuvant therapies. Today the following is noted and or reported: Last Clinic Visit: 5/11/22    He was started on Trelegy at last visit. He feels he is breathing better since starting this medication.   He has missed a few doses, but for the most part he takes consistently. I discussed with the patient that he can incorporate into his oral care to help with his consistency  He has only required his albuterol 3 times since his last visit  No acute exacerbations, ED/Urgent care visits  The patient denies any sputum or hemoptysis. No fever or chills. Use of Trelegy has not aggravated his resting tremor- he actually thinks his tremor is a bit better  Labs ordered at last visit- Allergy zone 2 not obtained and cancelled by lab. IgE obtain, Eosinophil count slightly improved  He does not have a lot of activities. Mobility is limited and he is board. He does not think he has a sleeping problem. One of his daughters has monitored his sleep. When he is very tired, he will snoring mildy.  No witnesed apneas while sleeping  BP Elevated today in clinic- patient is asymptomatic    Respiratory Triggers:  - Heat   - Activity       Occupation:   Retired: Prior  for [employer]- 27 years                         3 most recent [address] 8/10/2022   Little interest or pleasure in doing things Not at all   Feeling down, depressed, irritable, or hopeless Not at all   Total Score PHQ 2 0       Immunization History:  Immunization History   Administered Date(s) Administered    COVID-19, MODERNA BLUE border, Primary or Immunocompromised, (age 18y+), IM, 100 mcg/0.5mL 02/11/2021, 03/11/2021       Past Medical History:  Past Medical History:   Diagnosis Date    Arthritis     Asthma     Tompkins esophagus     Chronic obstructive pulmonary disease ([facility] [address]     DM (diabetes mellitus) ([facility] [address]     Gout     H/O kidney removal     Left    Hernia, umbilical     HTN (hypertension)     Hypercholesteremia     Neuropathy     Poor circulation        Past Surgical History:  Past Surgical History:   Procedure Laterality Date    COLONOSCOPY N/A 7/28/2022    COLONOSCOPY performed by Love Walsh MD at [address] [facility]      HX NEPHRECTOMY Left 1971    HX RHINOPLASTY         Family History:  No family history on file. Social History:  Social History     Tobacco Use    Smoking status: Former     Packs/day: 2.50     Years: 34.00     Pack years: 85.00     Types: Cigarettes     Quit date: 2002     Years since quittin.1    Smokeless tobacco: Former   Vaping Use    Vaping Use: Never used   Substance Use Topics    Alcohol use: Not Currently     Comment: Quit     Drug use: Never          Medications:  Current Outpatient Medications on File Prior to Visit   Medication Sig Dispense Refill    ascorbic acid, vitamin C, (VITAMIN C) 500 mg tablet Take 1,000 mg by mouth in the morning. cholecalciferol (VITAMIN D3) (2,000 UNITS /50 MCG) cap capsule Take 2,000 Units by mouth in the morning. COQ10, UBIQUINOL, PO Take 400 mg by mouth daily. rosuvastatin (CRESTOR) 5 mg tablet Take 5 mg by mouth in the morning. sertraline (ZOLOFT) 100 mg tablet Take 100 mg by mouth daily. fluticasone-umeclidin-vilanter (Trelegy Ellipta) 200-62.5-25 mcg inhaler Take 1 Puff by inhalation daily. 1 Each 0    albuterol (PROVENTIL HFA, VENTOLIN HFA, PROAIR HFA) 90 mcg/actuation inhaler Take 1 Puff by inhalation every six (6) hours as needed for Wheezing. 1 Each 2    allopurinoL (ZYLOPRIM) 100 mg tablet take 1 tablet by mouth twice a day (Patient taking differently: Take 100 mg by mouth in the morning.) 40 Tablet 1    tamsulosin (FLOMAX) 0.4 mg capsule Take 1 Capsule by mouth daily. 90 Capsule 1    finasteride (PROSCAR) 5 mg tablet Take 1 Tablet by mouth daily. 90 Tablet 1    colchicine 0.6 mg tablet Take 1 Tablet by mouth two (2) times a day. (Patient taking differently: Take 0.6 mg by mouth in the morning.) 40 Tablet 2    labetaloL (NORMODYNE) 300 mg tablet Take 1 Tablet by mouth two (2) times a day.  (Patient taking differently: Take 300 mg by mouth in the morning.) 60 Tablet 5     No current facility-administered medications on file prior to visit. Allergy:  Allergies   Allergen Reactions    Celebrex [Celecoxib] Myalgia    Other Medication Unknown (comments)     Patient says he is allergic to different medications but is not sure what they are. Review of Systems  General ROS: negative for - chills, fever, hot flashes, malaise or weight loss  ENT ROS: negative for - epistaxis, headaches,   nasal congestion, nasal discharge, nasal polyps, oral lesions, sinus pain, sneezing, sore throat, tinnitus, vertigo, visual changes or vocal changes, positive for hearing decrement  Hematological and Lymphatic ROS: negative for - bleeding problems, blood clots, bruising, jaundice, pallor or swollen lymph nodes  Endocrine ROS: negative for - polydipsia/polyuria, skin changes, temperature intolerance or unexpected weight changes  Respiratory ROS: negative for - hemoptysis, orthopnea, pleuritic pain, sputum changes or stridor  Cardiovascular ROS: positive for - chest pain, dyspnea on exertion and shortness of breath  negative for - edema, irregular heartbeat, loss of consciousness, orthopnea, palpitations or paroxysmal nocturnal dyspnea  Gastrointestinal ROS: no abdominal pain, change in bowel habits, or black or bloody stools  Genito-Urinary ROS: no dysuria, trouble voiding, or hematuria  Musculoskeletal ROS: positive for - joint pain and muscle pain  Neurological ROS: no TIA or stroke symptoms  Dermatological ROS: negative for - pruritus, rash or skin lesion changes   Psychological ROS: negative   Otherwise negative and per HPI      Physical Exam:  Blood pressure (!) 156/75, pulse 63, temperature 97.6 °F (36.4 °C), temperature source Oral, resp. rate 18, height 5' 9\" (1.753 m), weight 99.9 kg (220 lb 3.2 oz), SpO2 95 %. on RA, Body mass index is 32.52 kg/m². General: No distress, acyanotic, appears stated age, cooperative, pleasant.  Light beard  HEENT: PERRL, EOMI, throat without erythema or exudate, Tongue- large and with dental indention on tongue, Mallampati's score 3+, Uvula- midline, and hypertrophied   Neck: Supple,  no abnormally enlarged lymph nodes, thyroid is not enlarged, non-tender, No JVD, no carotid bruits  Chest: Increased A-P diameter  Lungs: Modest air entry, clear to auscultation bilaterally  Heart: Regular rate and rhythm, S1S2 present, without murmur. Abdomen: Obese,   abdomen is soft without significant tenderness, or guarding. Extremity: Negative for cyanosis, edema, or clubbing. Skin: Skin color, texture, turgor normal. No rashes or lesions. Neurological: CN 2-12 grossly intact, normal muscle tone. + resting tremor    Data Reviewed:  CBC:   Lab Results   Component Value Date/Time    WBC 5.6 08/01/2022 03:07 PM    HGB 10.1 (L) 08/01/2022 03:07 PM    HCT 30.6 (L) 08/01/2022 03:07 PM    PLATELET 966 99/29/1187 03:07 PM    MCV 89.7 08/01/2022 03:07 PM      Latest Reference Range & Units 12/9/21 11:13 8/1/22 15:07   WBC 4.6 - 13.2 K/uL 6.0 5.6   HGB 13.0 - 16.0 g/dL 11.8 (L) 10.1 (L)   PLATELET 507 - 564 K/uL 226 198   EOSINOPHILS 0 - 5 % 6 (H) 5   ABS. EOSINOPHILS 0.0 - 0.4 K/UL 0.4 0.3   (L): Data is abnormally low  (H): Data is abnormally high    Component Ref Range & Units 8/1/22 1507    Immunoglobulin E 6 - 495 IU/mL 26      JERE COMPREHENSIVE PLUS PANEL  Order: [id number]  Collected 8/1/2022 15:07    Status: Final result    Next appt: None    Dx: Wheezing; Dyspnea on exertion; Histor. ..    0 Result Notes    Component Ref Range & Units 8/1/22 1507    Anti-DNA (DS) Ab, QT 0 - 9 IU/mL 1    Comment: (NOTE)                                     Negative      <5                                     Equivocal  5 - 9                                     Positive      >9    RNP Abs 0.0 - 0.9 AI <0.2    Smith Abs 0.0 - 0.9 AI <0.2    Cleary/RNP Abs 0.0 - 0.9 AI <0.2    Scleroderma-70 Ab 0.0 - 0.9 AI <0.2    Sjogren's Anti-SS-A 0.0 - 0.9 AI <0.2    Sjogren's Anti-SS-B 0.0 - 0.9 AI <0.2    Antichromatin Ab 0.0 - 0.9 AI <0.2    Anti Ribosomal P Ab 0.0 - 0.9 AI <0.2    Anti-Judi-1 0.0 - 0.9 AI <0.2    Centromere B Ab 0.0 - 0.9 AI <0.2    See below   Comment    Comment: (NOTE)           BMP:   Lab Results   Component Value Date/Time    Sodium 141 12/09/2021 11:13 AM    Potassium 4.0 12/09/2021 11:13 AM    Chloride 110 12/09/2021 11:13 AM    CO2 24 12/09/2021 11:13 AM    Anion gap 7 12/09/2021 11:13 AM    Glucose 126 (H) 12/09/2021 11:13 AM    BUN 28 (H) 12/09/2021 11:13 AM    Creatinine 1.65 (H) 12/09/2021 11:13 AM    BUN/Creatinine ratio 17 12/09/2021 11:13 AM    GFR est AA 50 (L) 12/09/2021 11:13 AM    GFR est non-AA 41 (L) 12/09/2021 11:13 AM    Calcium 8.9 12/09/2021 11:13 AM        TSH:  No results found for: TSH, TSH2, TSH3, TSHP, TSHELE, TSHEXT, TSHEXT    Imaging:  [x]I have personally reviewed the patients radiographs section     CXR: 1/13/2REPORT:   Low lung volumes. Basilar predominant interstitial and small patchy airspace opacities are worrisome for infiltrates. There is no pleural effusion or pneumothorax. Trachea is midline. Unremarkable cardiomediastinal silhouette. Atherosclerotic calcification of thoracic aorta. Degenerative changes of thoracic spine and shoulders. IMPRESSION:   Hypoinflated lungs. Mild basilar predominant infiltrates. 2- [place]    Results from Abstract encounter on 07/11/22    XR CHEST SNGL V            Results from [place] encounter on 05/19/22    CT CHEST HIGH RES WO CONT    Narrative  EXAM:  CT Chest without Contrast.    CLINICAL INDICATION:    - Abnormal CXR [R93.89]  - Ex-smoker [L70.300]  - Dyspnea on exertion [R06.00]  - History of COVID-19 [Z86.16]  - Controlled type 2 diabetes mellitus with complication, without long-term  current use of insulin [E11.8]  - History of nephrectomy [Z90.5]  - Arthralgia, unspecified joint [M25.50]  - Wheezing [R06.2]  - 85 pack-year ex-smoker, BARCENAS, Abnormal CXR Jan 2022, eval for ILD    COMPARISON:  No prior CTs available.     TECHNIQUE:  Helically scanned volumetric axial sections of the chest are  obtained with without IV contrast administration using current institutional  modified standard HRCT protocol, i.e. supine end-inspiratory phase, supine  end-expiratory phase and prone end-inspiratory phase scans with axial 1.5 mm  reformats in 10 mm increments. Coronal and sagittal multiplanar reconstruction  images are generated for improved anatomic delineation. Radiation dose optimization techniques are utilized as appropriate to the exam,  with combination of automated exposure control, adjustment of the mA and/or kV  according to patient's size (including appropriate matching for site-specific  examinations), or use of iterative reconstruction technique. FINDINGS:    Lungs:    - No infiltrate or consolidation is identified. - 3 mm right upper lobe nodule (axial #16). 2 mm right upper lobe nodule (axial  #16). 2 mm right middle lobe nodule (axial#33). 3 mm right lower lobe nodule  (axial #29). - No interstitial thickening bilaterally. Pleura:  No significant pleural effusion is detected bilaterally. Mediastinum:  No adenopathy. Base of Neck:  No acute abnormalities. Axillae:  No adenopathy. Esophagus:  Unremarkable. Abdomen:  Multiple cysts in the right lobe of liver. The largest cysts measure  2.9 x 2.5 cm and 3.0 x 1.9 cm. Bones:  No acute findings. Impression  1. Multiple lung nodules as described. Recommend followup CT in 12 months. 2.  Hepatic cysts. 3.  No interstitial thickening. Cardiac Echo:     2/28/22:    CONCLUSIONS     * Normal left ventricular cavity size and systolic function. * LV ejection fraction 56% by biplane. * Mild concentric left ventricular hypertrophy. * Left ventricular diastolic function: normal.     * Mildly dilated left atrium. Normal right atrial size. * Normal right ventricular size and systolic function. * There is mild mitral valve regurgitation.      * No pulmonary hypertension, estimated pulmonary arterial systolic pressure   is 25 mmHg. Comparison     * No prior study is available for comparison. BEAU REST/STRESS MULTIPLE SPECT 2/28/22    Anatomical Region Laterality Modality   Chest -- Nuclear Medicine       Narrative  Performed by RADIOLOGY  CONCLUSIONS     * Low risk Lexiscan stress MPI. * Patient had chest pain response to [place] administration. * No significant ECG changes. * No evidence of ischemia or infarction. * Overall left ventricular systolic function is normal without regional wall   motion abnormalities. LVEF 71%. .     * No transient ischemic dilatation. Pulmonary Function Study Trends:    Date FVC (L) % Pred. FeV1(L) % Pred. FeV1/FVC % Pred. TLC (L) % Pred. DLCO % Pred.  Comments   4/7/22 3.60 89 2.90 2.38 66 92   19.95 73 Neg BD                                               6-Minute Walk Summary Trends:  Date Dyspnea Scale Distance (m) SpO2 Range  HR Range (bpm) O2  LPM    5/11/22 0-7 384 94-97  Room Air                          Historical Sleep Testing Data:        Carmencita Hernandez DO, [facility]  Pulmonary, Sleep and Critical Care Medicine

## 2022-08-10 NOTE — PATIENT INSTRUCTIONS
Please call our clinic back at [phone] or send a message on [employer] if you have any questions or concerns or if you are experiencing any of the following: You have not received a follow up appointment within 30 days prior the recommended follow up time. If you are not tolerating treatment plan and/or not able to obtain equipment or prescribed medication(s). if you are experiencing any difficulties with the [employer]  (DME) Company you may be using or is assigned to you. Two weeks have passed and you have not received an appointment for a scheduled procedure. Two weeks have passed since you underwent a test and/or procedure and you have not received your results. If you are using a CPAP/BIPAP, or Home Ventilator Device- Please note the following. Currently, many DMEs are experiencing supply chain difficulties and orders for equipment may be back logged several weeks to months. Your  Durable Medical Equipment (DME ) company is supposed to provide you with replacement filters, tubing and masks. You can either call your DME when you need new supplies or you can arrange for an automatic shipment schedule. Your need to be seen by our office at lat minimum of every 12 months in order to renew the prescription for these supplies. Please make note of who your DME company is and their phone number. Please make sure that you clean your mask and hosing on a regular basis. Your DME can provide you with additional information regarding proper care and cleaning of your device           Safety is strongly encouraged. You should not drive if sleepy, tired, distracted and/or  fatigued. If a procedure or imaging study has been ordered for your by this clinic please call the [place] [facility] or [facility] at  [address] [place] [address] South and blood work do not require appointments.   They are considered \"Walk-In\" services and can be obtained at either [facility] or [facility]. Blood work is performed at the Laboratory (Lab) from 08:00am - 04:00pm      -Thank you   Vaccine Information Statement    Pneumococcal Polysaccharide Vaccine (PPSV23): What You Need to Know    Many Vaccine Information Statements are available in [place] and other languages. See www.immunize.org/vis  Hojas de información sobre vacunas están disponibles en español y en muchos otros idiomas. Visite www.immunize.org/vis    1. Why get vaccinated? Pneumococcal polysaccharide vaccine (PPSV23) can prevent pneumococcal disease. Pneumococcal disease refers to any illness caused by pneumococcal bacteria. These bacteria can cause many types of illnesses, including pneumonia, which is an infection of the lungs. Pneumococcal bacteria are one of the most common causes of pneumonia. Besides pneumonia, pneumococcal bacteria can also cause:  Ear infections  Sinus infections  Meningitis (infection of the tissue covering the brain and spinal cord)  Bacteremia (bloodstream infection)    Anyone can get pneumococcal disease, but children under 3years of age, people with certain medical conditions, adults 72 years or older, and cigarette smokers are at the highest risk. Most pneumococcal infections are mild. However, some can result in long-term problems, such as brain damage or hearing loss. Meningitis, bacteremia, and pneumonia caused by pneumococcal disease can be fatal.     2. PPSV23     PPSV23 protects against 23 types of bacteria that cause pneumococcal disease. PPSV23 is recommended for: All adults 72 years or older,  Anyone 2 years or older with certain medical conditions that can lead to an increased risk for pneumococcal disease. Most people need only one dose of PPSV23. A second dose of PPSV23, and another type of pneumococcal vaccine called PCV13, are recommended for certain high-risk groups.   Your health care provider can give you more information. People 65 years or older should get a dose of PPSV23 even if they have already gotten one or more doses of the vaccine before they turned 72.    3. Talk with your health care provider    Tell your vaccine provider if the person getting the vaccine:  Has had an allergic reaction after a previous dose of PPSV23, or has any severe, life-threatening allergies. In some cases, your health care provider may decide to postpone PPSV23 vaccination to a future visit. People with minor illnesses, such as a cold, may be vaccinated. People who are moderately or severely ill should usually wait until they recover before getting PPSV23. Your health care provider can give you more information. 4. Risks of a vaccine reaction    Redness or pain where the shot is given, feeling tired, fever, or muscle aches can happen after PPSV23. People sometimes faint after medical procedures, including vaccination. Tell your provider if you feel dizzy or have vision changes or ringing in the ears. As with any medicine, there is a very remote chance of a vaccine causing a severe allergic reaction, other serious injury, or death. 5. What if there is a serious problem? An allergic reaction could occur after the vaccinated person leaves the clinic. If you see signs of a severe allergic reaction (hives, swelling of the face and throat, difficulty breathing, a fast heartbeat, dizziness, or weakness), call 9-1-1 and get the person to the nearest hospital.    For other signs that concern you, call your health care provider. Adverse reactions should be reported to the Vaccine Adverse Event Reporting System (VAERS). Your health care provider will usually file this report, or you can do it yourself. Visit the VAERS website at www.vaers. hhs.gov or call 5-590.798.6369. VAERS is only for reporting reactions, and VAERS staff do not give medical advice. 6. How can I learn more? Ask your health care provider. Call your local or state health department. Contact the Centers for Disease Control and Prevention (CDC):   Call 3-744.956.6948 (1-800-CDC-INFO) or  Visit CDCs website at www.cdc.gov/vaccines    Vaccine Information Statement   PPSV23   10/30/2019    [facility] and [facility] for Disease Control and Prevention    Office Use Only

## 2022-08-10 NOTE — PROGRESS NOTES
Quique Skelton presents today for No chief complaint on file. Is someone accompanying this pt? Daughter Sony Gregorio    Is the patient using any DME equipment during [address]? No    -DME Company No    Depression Screening:  3 most recent PHQ Screens 8/10/2022   Little interest or pleasure in doing things Not at all   Feeling down, depressed, irritable, or hopeless Not at all   Total Score PHQ 2 0       Learning Assessment:  Learning Assessment 5/11/2022   PRIMARY LEARNER Patient   PRIMARY LANGUAGE ENGLISH   LEARNER PREFERENCE PRIMARY DEMONSTRATION   RELATIONSHIP SELF       Abuse Screening:  No flowsheet data found. Fall Risk  Fall Risk Assessment, last 12 mths 5/11/2022   Able to walk? Yes   Fall in past 12 months? 0   Do you feel unsteady? 0   Are you worried about falling 0         Coordination of Care:  1. Have you been to the ER, urgent care clinic since your last visit? Hospitalized since your last visit? No    2. Have you seen or consulted any other health care providers outside of the [address] [place] [address] since your last visit? Include any pap smears or colon screening.  No

## 2022-08-30 DIAGNOSIS — N40.0 BENIGN PROSTATIC HYPERPLASIA WITHOUT LOWER URINARY TRACT SYMPTOMS: ICD-10-CM

## 2022-08-30 RX ORDER — FINASTERIDE 5 MG/1
TABLET, FILM COATED ORAL
Qty: 90 TABLET | Refills: 1 | OUTPATIENT
Start: 2022-08-30

## 2022-09-12 DIAGNOSIS — R06.2 WHEEZING: ICD-10-CM

## 2022-09-12 DIAGNOSIS — Z90.5 HISTORY OF NEPHRECTOMY: ICD-10-CM

## 2022-09-12 DIAGNOSIS — Z87.891 EX-SMOKER: ICD-10-CM

## 2022-09-12 DIAGNOSIS — Z86.16 HISTORY OF COVID-19: ICD-10-CM

## 2022-09-12 DIAGNOSIS — M25.50 ARTHRALGIA, UNSPECIFIED JOINT: ICD-10-CM

## 2022-09-12 DIAGNOSIS — R06.09 DYSPNEA ON EXERTION: ICD-10-CM

## 2022-09-12 DIAGNOSIS — R93.89 ABNORMAL CXR: ICD-10-CM

## 2022-09-12 DIAGNOSIS — E11.8 CONTROLLED TYPE 2 DIABETES MELLITUS WITH COMPLICATION, WITHOUT LONG-TERM CURRENT USE OF INSULIN (HCC): ICD-10-CM

## 2022-09-12 RX ORDER — FLUTICASONE FUROATE, UMECLIDINIUM BROMIDE AND VILANTEROL TRIFENATATE 200; 62.5; 25 UG/1; UG/1; UG/1
POWDER RESPIRATORY (INHALATION)
Qty: 60 BLISTER | Refills: 4 | Status: SHIPPED | OUTPATIENT
Start: 2022-09-12

## 2022-10-20 DIAGNOSIS — R06.00 DYSPNEA, UNSPECIFIED TYPE: ICD-10-CM

## 2022-10-20 DIAGNOSIS — R06.2 WHEEZING: ICD-10-CM

## 2022-10-20 DIAGNOSIS — J44.9 CHRONIC OBSTRUCTIVE PULMONARY DISEASE, UNSPECIFIED COPD TYPE (HCC): ICD-10-CM

## 2022-10-20 DIAGNOSIS — D72.19 PERIPHERAL EOSINOPHILIA: ICD-10-CM

## 2022-10-20 DIAGNOSIS — R91.8 PULMONARY NODULES: Primary | ICD-10-CM

## 2022-10-20 NOTE — PROGRESS NOTES
Order placed for allergen zone 2 lab, per Verbal Order from Dr. Nadir Carmona on 10/20/2022. Last office visit: 8/10/2022  Follow up Visit: Due March 2023    Provider is aware of last office visit and follow up. No further action requested from provider.

## 2023-01-31 DIAGNOSIS — R91.8 PULMONARY NODULES: Primary | ICD-10-CM

## 2023-02-03 DIAGNOSIS — R91.8 PULMONARY NODULES: Primary | ICD-10-CM

## 2023-04-25 ENCOUNTER — ANESTHESIA EVENT (OUTPATIENT)
Facility: HOSPITAL | Age: 78
End: 2023-04-25
Payer: MEDICARE

## 2023-04-25 RX ORDER — FLUTICASONE PROPIONATE 50 MCG
1 SPRAY, SUSPENSION (ML) NASAL DAILY
COMMUNITY

## 2023-04-25 RX ORDER — CETIRIZINE HYDROCHLORIDE 10 MG/1
10 TABLET ORAL DAILY
COMMUNITY

## 2023-04-25 NOTE — PROGRESS NOTES
PRE-SURGICAL INSTRUCTIONS        Patient's Name:  Max Saab      TQREI'L Date:  4/25/2023                Surgery Date:  04/26/2023                Do NOT eat or drink anything, including candy, gum, or ice chips after midnight on 04/26/2023, unless you have specific instructions from your surgeon or anesthesia provider to do so. You may brush your teeth before coming to the hospital.  No smoking 24 hours prior to the day of surgery. No alcohol 24 hours prior to the day of surgery. No recreational drugs for one week prior to the day of surgery. Leave all valuables, including money/purse, at home. Remove all jewelry, nail polish, acrylic nails, and makeup (including mascara); no lotions powders, deodorant, or perfume/cologne/after shave on the skin. Follow instruction for Hibiclens washes and CHG wipes from surgeon's office. Glasses/contact lenses and dentures may be worn to the hospital.  They will be removed prior to surgery. Call your doctor if symptoms of a cold or illness develop within 24-48 hours prior to your surgery. 11.  If you are having an outpatient procedure, please make arrangements for a responsible ADULT TO [address] [place] [address] and stay with you for 24 hours after your surgery. 12. ONE VISITOR in the hospital at this time for outpatient procedures. Exceptions may be made for surgical admissions, per nursing unit guidelines      Special Instructions:      Bring list of CURRENT medications. Bring inhaler. Bring any pertinent legal medical records. Take these medications the morning of surgery with a sip of water as instructed by office. On the day of surgery, come in the main entrance of [facility]. Let the  at the desk know you are there for surgery. A staff member will come escort you to the surgical area on the second floor.     If you have any questions or concerns, please do not hesitate to call:     (Prior to the day of surgery) RAQUEL

## 2023-04-26 ENCOUNTER — ANESTHESIA (OUTPATIENT)
Facility: HOSPITAL | Age: 78
End: 2023-04-26
Payer: MEDICARE

## 2023-04-26 ENCOUNTER — HOSPITAL ENCOUNTER (OUTPATIENT)
Facility: HOSPITAL | Age: 78
Setting detail: OUTPATIENT SURGERY
Discharge: HOME | End: 2023-04-26
Attending: INTERNAL MEDICINE | Admitting: INTERNAL MEDICINE
Payer: MEDICARE

## 2023-04-26 VITALS
OXYGEN SATURATION: 97 % | HEART RATE: 53 BPM | DIASTOLIC BLOOD PRESSURE: 73 MMHG | RESPIRATION RATE: 17 BRPM | WEIGHT: 221.3 LBS | BODY MASS INDEX: 32.78 KG/M2 | TEMPERATURE: 97.4 F | HEIGHT: 69 IN | SYSTOLIC BLOOD PRESSURE: 151 MMHG

## 2023-04-26 LAB
GLUCOSE BLD STRIP.AUTO-MCNC: 162 MG/DL (ref 70–110)
GLUCOSE BLD STRIP.AUTO-MCNC: 163 MG/DL (ref 70–110)

## 2023-04-26 PROCEDURE — 3600007502: Performed by: INTERNAL MEDICINE

## 2023-04-26 PROCEDURE — 3700000001 HC ADD 15 MINUTES (ANESTHESIA): Performed by: INTERNAL MEDICINE

## 2023-04-26 PROCEDURE — 2580000003 HC RX 258: Performed by: NURSE ANESTHETIST, CERTIFIED REGISTERED

## 2023-04-26 PROCEDURE — 00731 ANES UPR GI NDSC PX NOS: CPT | Performed by: ANESTHESIOLOGY

## 2023-04-26 PROCEDURE — 7100000010 HC PHASE II RECOVERY - FIRST 15 MIN: Performed by: INTERNAL MEDICINE

## 2023-04-26 PROCEDURE — 82962 GLUCOSE BLOOD TEST: CPT

## 2023-04-26 PROCEDURE — 88305 TISSUE EXAM BY PATHOLOGIST: CPT

## 2023-04-26 PROCEDURE — 7100000011 HC PHASE II RECOVERY - ADDTL 15 MIN: Performed by: INTERNAL MEDICINE

## 2023-04-26 PROCEDURE — 6360000002 HC RX W HCPCS: Performed by: NURSE ANESTHETIST, CERTIFIED REGISTERED

## 2023-04-26 PROCEDURE — 99100 ANES PT EXTEME AGE<1 YR&>70: CPT | Performed by: ANESTHESIOLOGY

## 2023-04-26 PROCEDURE — 7100000000 HC PACU RECOVERY - FIRST 15 MIN: Performed by: INTERNAL MEDICINE

## 2023-04-26 PROCEDURE — 2500000003 HC RX 250 WO HCPCS: Performed by: NURSE ANESTHETIST, CERTIFIED REGISTERED

## 2023-04-26 PROCEDURE — 3700000000 HC ANESTHESIA ATTENDED CARE: Performed by: INTERNAL MEDICINE

## 2023-04-26 PROCEDURE — 3600007512: Performed by: INTERNAL MEDICINE

## 2023-04-26 PROCEDURE — 2709999900 HC NON-CHARGEABLE SUPPLY: Performed by: INTERNAL MEDICINE

## 2023-04-26 RX ORDER — SODIUM CHLORIDE 0.9 % (FLUSH) 0.9 %
5-40 SYRINGE (ML) INJECTION PRN
Status: DISCONTINUED | OUTPATIENT
Start: 2023-04-26 | End: 2023-04-26 | Stop reason: HOSPADM

## 2023-04-26 RX ORDER — SODIUM CHLORIDE, SODIUM LACTATE, POTASSIUM CHLORIDE, CALCIUM CHLORIDE 600; 310; 30; 20 MG/100ML; MG/100ML; MG/100ML; MG/100ML
INJECTION, SOLUTION INTRAVENOUS CONTINUOUS
Status: DISCONTINUED | OUTPATIENT
Start: 2023-04-26 | End: 2023-04-26 | Stop reason: HOSPADM

## 2023-04-26 RX ORDER — PROPOFOL 10 MG/ML
INJECTION, EMULSION INTRAVENOUS PRN
Status: DISCONTINUED | OUTPATIENT
Start: 2023-04-26 | End: 2023-04-26 | Stop reason: SDUPTHER

## 2023-04-26 RX ORDER — INSULIN LISPRO 100 [IU]/ML
0-4 INJECTION, SOLUTION INTRAVENOUS; SUBCUTANEOUS EVERY 4 HOURS
Status: DISCONTINUED | OUTPATIENT
Start: 2023-04-26 | End: 2023-04-26 | Stop reason: HOSPADM

## 2023-04-26 RX ORDER — INSULIN LISPRO 100 [IU]/ML
0-4 INJECTION, SOLUTION INTRAVENOUS; SUBCUTANEOUS
Status: DISCONTINUED | OUTPATIENT
Start: 2023-04-26 | End: 2023-04-26 | Stop reason: HOSPADM

## 2023-04-26 RX ORDER — DEXTROSE MONOHYDRATE 100 MG/ML
INJECTION, SOLUTION INTRAVENOUS CONTINUOUS PRN
Status: DISCONTINUED | OUTPATIENT
Start: 2023-04-26 | End: 2023-04-26 | Stop reason: HOSPADM

## 2023-04-26 RX ORDER — SODIUM CHLORIDE 9 MG/ML
INJECTION, SOLUTION INTRAVENOUS PRN
Status: DISCONTINUED | OUTPATIENT
Start: 2023-04-26 | End: 2023-04-26 | Stop reason: HOSPADM

## 2023-04-26 RX ORDER — SODIUM CHLORIDE 0.9 % (FLUSH) 0.9 %
5-40 SYRINGE (ML) INJECTION EVERY 12 HOURS SCHEDULED
Status: DISCONTINUED | OUTPATIENT
Start: 2023-04-26 | End: 2023-04-26 | Stop reason: HOSPADM

## 2023-04-26 RX ORDER — LIDOCAINE HYDROCHLORIDE 20 MG/ML
INJECTION, SOLUTION EPIDURAL; INFILTRATION; INTRACAUDAL; PERINEURAL PRN
Status: DISCONTINUED | OUTPATIENT
Start: 2023-04-26 | End: 2023-04-26 | Stop reason: SDUPTHER

## 2023-04-26 RX ORDER — INSULIN LISPRO 100 [IU]/ML
0-4 INJECTION, SOLUTION INTRAVENOUS; SUBCUTANEOUS NIGHTLY
Status: DISCONTINUED | OUTPATIENT
Start: 2023-04-26 | End: 2023-04-26 | Stop reason: HOSPADM

## 2023-04-26 RX ORDER — ONDANSETRON 2 MG/ML
4 INJECTION INTRAMUSCULAR; INTRAVENOUS
Status: DISCONTINUED | OUTPATIENT
Start: 2023-04-26 | End: 2023-04-26 | Stop reason: HOSPADM

## 2023-04-26 RX ADMIN — LIDOCAINE HYDROCHLORIDE 40 MG: 20 INJECTION, SOLUTION EPIDURAL; INFILTRATION; INTRACAUDAL; PERINEURAL at 12:54

## 2023-04-26 RX ADMIN — SODIUM CHLORIDE, SODIUM LACTATE, POTASSIUM CHLORIDE, AND CALCIUM CHLORIDE: 600; 310; 30; 20 INJECTION, SOLUTION INTRAVENOUS at 11:36

## 2023-04-26 RX ADMIN — SODIUM CHLORIDE, SODIUM LACTATE, POTASSIUM CHLORIDE, AND CALCIUM CHLORIDE: 600; 310; 30; 20 INJECTION, SOLUTION INTRAVENOUS at 12:50

## 2023-04-26 RX ADMIN — PROPOFOL 25 MG: 10 INJECTION, EMULSION INTRAVENOUS at 12:56

## 2023-04-26 RX ADMIN — PROPOFOL 30 MG: 10 INJECTION, EMULSION INTRAVENOUS at 12:59

## 2023-04-26 RX ADMIN — PROPOFOL 100 MG: 10 INJECTION, EMULSION INTRAVENOUS at 12:54

## 2023-04-26 RX ADMIN — PROPOFOL 25 MG: 10 INJECTION, EMULSION INTRAVENOUS at 13:01

## 2023-04-26 ASSESSMENT — PAIN - FUNCTIONAL ASSESSMENT: PAIN_FUNCTIONAL_ASSESSMENT: NONE - DENIES PAIN

## 2023-04-26 ASSESSMENT — COPD QUESTIONNAIRES: CAT_SEVERITY: NO INTERVAL CHANGE

## 2023-04-26 NOTE — H&P
GASTROENTEROLOGY Pre-Procedure H and P      Impression/Plan:   1. This patient is consented for an EGD for  Follow-up as nodule in the duodenum-concern for adenomatous change versus lipoma; history of Potter's esophagus    Addendum: All lab tests orders pertaining to the procedure have been ordered by the anesthesia personnel and results will be addressed by the anesthesia team    Chief Complaint: Follow-up as nodule in the duodenum-concern for adenomatous change versus lipoma; history of Potter's esophagus    HPI:  Gordo Leach is a 69 y.o. male who is having an EGD for Follow-up as nodule in the duodenum-concern for adenomatous change versus lipoma; history of Potter's esophagus    PMH:   Past Medical History:   Diagnosis Date    Arthritis     Asthma     Potter esophagus     Chronic obstructive pulmonary disease ([facility]     DM (diabetes mellitus) ([facility]     Gout     H/O kidney removal     Left    Hernia, umbilical     HTN (hypertension)     Hypercholesteremia     Neuropathy     Poor circulation        PSH:   Past Surgical History:   Procedure Laterality Date    COLONOSCOPY N/A 2022    COLONOSCOPY performed by Jennifer Leonardo MD at [facility] . Left 1971    RHINOPLASTY         Social HX:   Social History     Socioeconomic History    Marital status:       Spouse name: Not on file    Number of children: Not on file    Years of education: Not on file    Highest education level: Not on file   Occupational History    Not on file   Tobacco Use    Smoking status: Former     Packs/day: 2.50     Types: Cigarettes     Quit date: 2002     Years since quittin.8    Smokeless tobacco: Former   Vaping Use    Vaping Use: Never used   Substance and Sexual Activity    Alcohol use: Not Currently    Drug use: Never    Sexual activity: Not on file   Other Topics Concern    Not on file   Social History Narrative    Not on file     Social Determinants of Health

## 2023-04-26 NOTE — BRIEF OP NOTE
[facility]  [facility], [address]      Brief Procedure Note    Ibeth Standing  1945  [id number]    Date of Procedure: 4/26/2023     Preoperative diagnosis: Potter's esophagus without dysplasia [K22.70]  Gastroesophageal reflux disease, unspecified whether esophagitis present [K21.9]  Obesity (BMI 30-39. 9) [E66.9]  Duodenal adenoma [D13.2]    Postoperative diagnosis: Potter's esophagus without dysplasia [K22.70]  Gastroesophageal reflux disease, unspecified whether esophagitis present [K21.9]  Obesity (BMI 30-39. 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 Marco Mccarty MD    Assistant(s): Circulator: Stephon Woodward RN; Kristie Arnold RN    Devices/implants/grafts/tissues/prosthesis: None    EBL:None    Specimens:   ID Type Source Tests Collected by Time Destination   A : Duodenal Nodule Bx's Tissue Duodenum SURGICAL PATHOLOGY Marco Mccarty MD 4/26/2023 1257    B : Potter's Esophagus Bx's Tissue Esophagus SURGICAL PATHOLOGY Marco Mccarty MD 4/26/2023 1259        Findings: See printed and scanned procedure note    Complications: None    Dr. Marco Mccarty MD  4/26/2023  1:11 PM You can access the FollowMyHealth Patient Portal offered by [facility] by registering at the following website: [email or web address]. By joining [employer]’s FollowMyHealth portal, you will also be able to view your health information using other applications (apps) compatible with our system.

## 2023-04-26 NOTE — ANESTHESIA POSTPROCEDURE EVALUATION
Department of Anesthesiology  Postprocedure Note    Patient: Genet Lambert  MRN: [id number]  YOB: 1945  Date of evaluation: 4/26/2023      Procedure Summary     Date: 04/26/23 [facility] / [facility] [address] END[facility]    Anesthesia Start: 1250 Anesthesia Stop: 1310    Procedure: EGD ESOPHAGOGASTRODUODENOSCOPY w/Biopses (Upper GI Region) Diagnosis:       Potter's esophagus without dysplasia      Gastroesophageal reflux disease, unspecified whether esophagitis present      Obesity (BMI 30-39. 9)      Duodenal adenoma      (Potter's esophagus without dysplasia [K22.70])      (Gastroesophageal reflux disease, unspecified whether esophagitis present [K21.9])      (Obesity (BMI 30-39. 9) [E66.9])      (Duodenal adenoma [D13.2])    Surgeons: Penelope Guerrero MD Responsible Provider: Dorothy Lazar MD    Anesthesia Type: MAC ASA Status: 3          Anesthesia Type: MAC    Kelley Phase I: Kelley Score: 10    Kelley Phase II: Kelley Score: 10      Anesthesia Post Evaluation    Patient location during evaluation: PACU  Patient participation: complete - patient participated  Level of consciousness: awake and alert  Airway patency: patent  Nausea & Vomiting: no nausea and no vomiting  Complications: no  Cardiovascular status: hemodynamically stable  Respiratory status: acceptable  Hydration status: stable  Multimodal analgesia pain management approach

## 2023-04-26 NOTE — ANESTHESIA PRE PROCEDURE
11:13 AM    BUN 28 12/09/2021 11:13 AM    CREATININE 1.65 12/09/2021 11:13 AM    GFRAA 50 12/09/2021 11:13 AM    AGRATIO 1.2 12/09/2021 11:13 AM    GLUCOSE 126 12/09/2021 11:13 AM    PROT 7.0 12/09/2021 11:13 AM    CALCIUM 8.9 12/09/2021 11:13 AM    BILITOT 0.5 12/09/2021 11:13 AM    ALKPHOS 82 12/09/2021 11:13 AM    AST 19 12/09/2021 11:13 AM    ALT 28 12/09/2021 11:13 AM       POC Tests: No results for input(s): POCGLU, POCNA, POCK, POCCL, POCBUN, POCHEMO, POCHCT in the last 72 hours. Coags: No results found for: PROTIME, INR, APTT    HCG (If Applicable): No results found for: PREGTESTUR, PREGSERUM, HCG, HCGQUANT     ABGs: No results found for: PHART, PO2ART, ZWP9HMU, DPA9SOQ, BEART, A1WVPWSH     Type & Screen (If Applicable):  No results found for: LABABO, LABRH    Drug/Infectious Status (If Applicable):  No results found for: HIV, HEPCAB    COVID-19 Screening (If Applicable): No results found for: COVID19        Anesthesia Evaluation  Patient summary reviewed  Airway: Mallampati: II  TM distance: >3 FB   Neck ROM: full  Mouth opening: > = 3 FB   Dental:    (+) edentulous      Pulmonary:   (+) COPD: no interval change,  asthma:                            Cardiovascular:    (+) hypertension: no interval change,       ECG reviewed                        Neuro/Psych:               GI/Hepatic/Renal:             Endo/Other:    (+) Diabetesno interval change, , .                 Abdominal:             Vascular: Other Findings:           Anesthesia Plan      MAC     ASA 3       Induction: intravenous. Anesthetic plan and risks discussed with patient. Plan discussed with CRNA.     Attending anesthesiologist reviewed and agrees with Elisha Valentine MD   4/26/2023

## 2023-04-26 NOTE — DISCHARGE INSTRUCTIONS
Upper GI Endoscopy: What to Expect at [address] had an upper GI endoscopy. Your doctor used a thin, lighted tube that bends to look at the inside of your esophagus, your stomach, and the first part of the small intestine, called the duodenum. After you have an endoscopy, you will stay at the hospital or clinic for 1 to 2 hours. This will allow the medicine to wear off. You will be able to go home after your doctor or nurse checks to make sure that you're not having any problems. You may have to stay overnight if you had treatment during the test. You may have a sore throat for a day or two after the test.  This care sheet gives you a general idea about what to expect after the test.  How can you care for yourself at home? Activity   Rest as much as you need to after you go home. You should be able to go back to your usual activities the day after the test.  Diet   Follow your doctor's directions for eating after the test.  Drink plenty of fluids (unless your doctor has told you not to). Medications   If you have a sore throat the day after the test, use an over-the-counter spray to numb your throat. Follow-up care is a key part of your treatment and safety. Be sure to make and go to all appointments, and call your doctor if you are having problems. It's also a good idea to know your test results and keep a list of the medicines you take. When should you call for help? Call 911 anytime you think you may need emergency care. For example, call if:    You passed out (lost consciousness). You have trouble breathing. You pass maroon or bloody stools. Call your doctor now or seek immediate medical care if:    You have pain that does not get better after your take pain medicine. You have new or worse belly pain. You have blood in your stools. You are sick to your stomach and cannot keep fluids down. You have a fever. You cannot pass stools or gas.    Watch closely

## 2023-05-04 RX ORDER — FLUTICASONE FUROATE, UMECLIDINIUM BROMIDE AND VILANTEROL TRIFENATATE 200; 62.5; 25 UG/1; UG/1; UG/1
POWDER RESPIRATORY (INHALATION)
Qty: 1 EACH | Refills: 5 | Status: SHIPPED | OUTPATIENT
Start: 2023-05-04

## 2023-05-24 ENCOUNTER — HOSPITAL ENCOUNTER (OUTPATIENT)
Facility: HOSPITAL | Age: 78
Discharge: HOME | End: 2023-05-27
Payer: MEDICARE

## 2023-05-24 DIAGNOSIS — R91.8 PULMONARY NODULES: ICD-10-CM

## 2023-05-24 PROCEDURE — 71250 CT THORAX DX C-: CPT

## 2023-05-30 RX ORDER — ALBUTEROL SULFATE 90 UG/1
AEROSOL, METERED RESPIRATORY (INHALATION)
Qty: 8.5 G | Refills: 3 | Status: SHIPPED | OUTPATIENT
Start: 2023-05-30

## 2023-08-14 ENCOUNTER — OFFICE VISIT (OUTPATIENT)
Age: 78
End: 2023-08-14
Payer: MEDICARE

## 2023-08-14 VITALS
RESPIRATION RATE: 16 BRPM | SYSTOLIC BLOOD PRESSURE: 129 MMHG | HEART RATE: 63 BPM | OXYGEN SATURATION: 96 % | HEIGHT: 69 IN | BODY MASS INDEX: 34.07 KG/M2 | TEMPERATURE: 97.3 F | DIASTOLIC BLOOD PRESSURE: 61 MMHG | WEIGHT: 230 LBS

## 2023-08-14 DIAGNOSIS — G47.10 HYPERSOMNIA: ICD-10-CM

## 2023-08-14 DIAGNOSIS — R91.8 PULMONARY NODULES: ICD-10-CM

## 2023-08-14 DIAGNOSIS — Z87.891 EX-SMOKER FOR MORE THAN 1 YEAR: ICD-10-CM

## 2023-08-14 DIAGNOSIS — N18.32 CHRONIC KIDNEY DISEASE, STAGE 3B (HCC): ICD-10-CM

## 2023-08-14 DIAGNOSIS — R06.00 DYSPNEA, UNSPECIFIED TYPE: ICD-10-CM

## 2023-08-14 DIAGNOSIS — E11.8 TYPE 2 DIABETES MELLITUS WITH UNSPECIFIED COMPLICATIONS (HCC): ICD-10-CM

## 2023-08-14 DIAGNOSIS — J44.9 CHRONIC OBSTRUCTIVE PULMONARY DISEASE, UNSPECIFIED COPD TYPE (HCC): Primary | ICD-10-CM

## 2023-08-14 PROCEDURE — 1123F ACP DISCUSS/DSCN MKR DOCD: CPT | Performed by: INTERNAL MEDICINE

## 2023-08-14 PROCEDURE — 99214 OFFICE O/P EST MOD 30 MIN: CPT | Performed by: INTERNAL MEDICINE

## 2023-08-14 RX ORDER — CITALOPRAM 20 MG/1
TABLET ORAL
COMMUNITY
Start: 2023-05-18

## 2023-08-14 RX ORDER — ALBUTEROL SULFATE 90 UG/1
AEROSOL, METERED RESPIRATORY (INHALATION)
Qty: 8.5 G | Refills: 3 | Status: SHIPPED | OUTPATIENT
Start: 2023-08-14

## 2023-08-14 RX ORDER — OXYBUTYNIN CHLORIDE 5 MG/1
5 TABLET ORAL NIGHTLY
COMMUNITY
Start: 2023-07-17

## 2023-08-14 RX ORDER — TRIAMTERENE AND HYDROCHLOROTHIAZIDE 37.5; 25 MG/1; MG/1
1 CAPSULE ORAL DAILY
COMMUNITY
Start: 2023-08-06 | End: 2023-08-14

## 2023-08-14 RX ORDER — GABAPENTIN 100 MG/1
CAPSULE ORAL
COMMUNITY
Start: 2023-08-13

## 2023-08-14 RX ORDER — CELECOXIB 200 MG/1
200 CAPSULE ORAL 2 TIMES DAILY
COMMUNITY
End: 2023-08-14

## 2023-08-14 RX ORDER — METOPROLOL SUCCINATE 25 MG/1
25 TABLET, EXTENDED RELEASE ORAL DAILY
COMMUNITY
End: 2023-08-14

## 2023-08-14 ASSESSMENT — SLEEP AND FATIGUE QUESTIONNAIRES
HOW LIKELY ARE YOU TO NOD OFF OR FALL ASLEEP WHILE SITTING AND READING: 3
HOW LIKELY ARE YOU TO NOD OFF OR FALL ASLEEP WHILE SITTING INACTIVE IN A PUBLIC PLACE: 1
HOW LIKELY ARE YOU TO NOD OFF OR FALL ASLEEP IN A CAR, WHILE STOPPED FOR A FEW MINUTES IN TRAFFIC: 0
HOW LIKELY ARE YOU TO NOD OFF OR FALL ASLEEP WHILE LYING DOWN TO REST IN THE AFTERNOON WHEN CIRCUMSTANCES PERMIT: 3
HOW LIKELY ARE YOU TO NOD OFF OR FALL ASLEEP WHILE SITTING AND TALKING TO SOMEONE: 0
HOW LIKELY ARE YOU TO NOD OFF OR FALL ASLEEP WHILE WATCHING TV: 3
HOW LIKELY ARE YOU TO NOD OFF OR FALL ASLEEP WHEN YOU ARE A PASSENGER IN A CAR FOR AN HOUR WITHOUT A BREAK: 1
HOW LIKELY ARE YOU TO NOD OFF OR FALL ASLEEP WHILE SITTING QUIETLY AFTER LUNCH WITHOUT ALCOHOL: 3
ESS TOTAL SCORE: 14

## 2023-08-14 NOTE — PATIENT INSTRUCTIONS
Please call our clinic back at [phone] or send a message on [employer] if you have any questions or concerns or if you are experiencing any of the following: You have not received a follow up appointment within 30 days prior the recommended follow up time. If you are not tolerating treatment plan and/or not able to obtain equipment or prescribed medication(s). if you are experiencing any difficulties with the [address] [place] [address] 1  (DME) Company you may be using or is assigned to you. Two weeks have passed and you have not received an appointment for a scheduled procedure. Two weeks have passed since you underwent a test and/or procedure and you have not received your results. Patients on Inhaler Therapy: If you are having difficulty and/or are not able to obtain refills of your inhalers- Please contact our office as soon as possible  Please read directions carefully and use inhalers as directed  If you are not sure how to properly use your inhaler please call our office or ask your pharmacist.  Instructional video can also be found on-line as well    If you are using a CPAP/BIPAP, or Home Ventilator Device- Please note the following. Currently, many DMEs are experiencing supply chain difficulties and orders for equipment may be back logged several weeks to months. Your  Durable Medical Equipment (DME ) company is supposed to provide you with replacement filters, tubing and masks. You can either call your DME when you need new supplies or you can arrange for an automatic shipment schedule. Your need to be seen by our office at lat minimum of every 12 months in order to renew the prescription for these supplies. Please make note of who your DME company is and their phone number. Please make sure that you clean your mask and hosing on a regular basis.   Your DME can provide you with additional information regarding proper care and cleaning of your device           Safety is

## 2023-08-14 NOTE — PROGRESS NOTES
Hollis Bishop presents today for   Chief Complaint   Patient presents with    Pulmonary Embolism    COPD       Is someone accompanying this pt? No    Is the patient using any DME equipment during OV? No    -DME Company NA    Depression Screening:    PHQ-9 Questionaire 8/10/2022   Little interest or pleasure in doing things 0   Feeling down, depressed, or hopeless 0   PHQ-9 Total Score 0       Learning Needs Questionnaire:     Who is the primary learner? Patient    What is the preferred language for health care of the primary learner? ENGLISH    How does the primary learner prefer to learn new concepts? DEMONSTRATION    Answered By Patient    Relationship to Learner SELF          Fall Risk:     No flowsheet data found. Abuse Screening:     No flowsheet data found. Coordination of Care:    1. Have you been to the ER, urgent care clinic since your last visit? Hospitalized since your last visit? No    2. Have you seen or consulted any other health care providers outside of the [address] [place] [address] since your last visit? Include any pap smears or colon screening. PCP    Medication list has been update per patient.

## 2023-10-19 ENCOUNTER — HOSPITAL ENCOUNTER (OUTPATIENT)
Dept: SLEEP MEDICINE | Facility: HOSPITAL | Age: 78
End: 2023-10-19
Attending: INTERNAL MEDICINE
Payer: MEDICARE

## 2023-10-19 DIAGNOSIS — N18.32 CHRONIC KIDNEY DISEASE, STAGE 3B (HCC): ICD-10-CM

## 2023-10-19 DIAGNOSIS — G47.10 HYPERSOMNIA: ICD-10-CM

## 2023-10-19 DIAGNOSIS — J44.9 CHRONIC OBSTRUCTIVE PULMONARY DISEASE, UNSPECIFIED COPD TYPE (HCC): ICD-10-CM

## 2023-10-19 DIAGNOSIS — R91.8 PULMONARY NODULES: ICD-10-CM

## 2023-10-19 DIAGNOSIS — E11.8 TYPE 2 DIABETES MELLITUS WITH UNSPECIFIED COMPLICATIONS (HCC): ICD-10-CM

## 2023-10-19 DIAGNOSIS — R06.00 DYSPNEA, UNSPECIFIED TYPE: ICD-10-CM

## 2023-10-19 PROCEDURE — 95811 POLYSOM 6/>YRS CPAP 4/> PARM: CPT

## 2023-10-19 ASSESSMENT — SLEEP AND FATIGUE QUESTIONNAIRES
HOW LIKELY ARE YOU TO NOD OFF OR FALL ASLEEP WHILE SITTING AND READING: 2
HOW LIKELY ARE YOU TO NOD OFF OR FALL ASLEEP WHILE SITTING QUIETLY AFTER LUNCH WITHOUT ALCOHOL: 0
HOW LIKELY ARE YOU TO NOD OFF OR FALL ASLEEP WHILE LYING DOWN TO REST IN THE AFTERNOON WHEN CIRCUMSTANCES PERMIT: 2
HOW LIKELY ARE YOU TO NOD OFF OR FALL ASLEEP WHILE WATCHING TV: 3
HOW LIKELY ARE YOU TO NOD OFF OR FALL ASLEEP WHILE SITTING INACTIVE IN A PUBLIC PLACE: 2
HOW LIKELY ARE YOU TO NOD OFF OR FALL ASLEEP WHEN YOU ARE A PASSENGER IN A CAR FOR AN HOUR WITHOUT A BREAK: 1
HOW LIKELY ARE YOU TO NOD OFF OR FALL ASLEEP WHILE SITTING AND TALKING TO SOMEONE: 0
ESS TOTAL SCORE: 11
HOW LIKELY ARE YOU TO NOD OFF OR FALL ASLEEP IN A CAR, WHILE STOPPED FOR A FEW MINUTES IN TRAFFIC: 1

## 2023-10-20 VITALS
SYSTOLIC BLOOD PRESSURE: 173 MMHG | HEIGHT: 69 IN | BODY MASS INDEX: 34.87 KG/M2 | HEART RATE: 61 BPM | WEIGHT: 235.4 LBS | DIASTOLIC BLOOD PRESSURE: 84 MMHG

## 2023-10-20 NOTE — PROGRESS NOTES
Patient arrived for sleep study. Physician orders were reviewed. Patient education to include initiation of PAP therapy per protocol. Patient questions were answered. The patient demonstrated good understanding of the positive airway pressure device.
Sleep study completed per physician order. Patient discharged from sleep center. Patient awake, alert and able to leave the facility under their own power. Instructed to follow up with their sleep physician for results.
Full range of motion of upper and lower extremities, no joint tenderness/swelling.

## 2023-12-11 ENCOUNTER — CLINICAL DOCUMENTATION (OUTPATIENT)
Age: 78
End: 2023-12-11

## 2023-12-11 DIAGNOSIS — G47.33 MODERATE OBSTRUCTIVE SLEEP APNEA: Primary | ICD-10-CM

## 2024-07-24 ENCOUNTER — HOSPITAL ENCOUNTER (OUTPATIENT)
Facility: HOSPITAL | Age: 79
Discharge: HOME | End: 2024-07-27
Payer: MEDICARE

## 2024-07-24 DIAGNOSIS — Z01.818 PRE-OP TESTING: Primary | ICD-10-CM

## 2024-07-24 LAB
ANION GAP SERPL CALC-SCNC: 6 MMOL/L (ref 3–18)
BUN SERPL-MCNC: 49 MG/DL (ref 7–18)
BUN/CREAT SERPL: 21 (ref 12–20)
CALCIUM SERPL-MCNC: 8.6 MG/DL (ref 8.5–10.1)
CHLORIDE SERPL-SCNC: 110 MMOL/L (ref 100–111)
CO2 SERPL-SCNC: 27 MMOL/L (ref 21–32)
CREAT SERPL-MCNC: 2.32 MG/DL (ref 0.6–1.3)
ERYTHROCYTE [DISTWIDTH] IN BLOOD BY AUTOMATED COUNT: 14 % (ref 11.6–14.5)
EST. AVERAGE GLUCOSE BLD GHB EST-MCNC: 137 MG/DL
GLUCOSE SERPL-MCNC: 117 MG/DL (ref 74–99)
HBA1C MFR BLD: 6.4 % (ref 4.2–5.6)
HCT VFR BLD AUTO: 30.4 % (ref 36–48)
HGB BLD-MCNC: 9.9 G/DL (ref 13–16)
MCH RBC QN AUTO: 29.2 PG (ref 24–34)
MCHC RBC AUTO-ENTMCNC: 32.6 G/DL (ref 31–37)
MCV RBC AUTO: 89.7 FL (ref 78–100)
NRBC # BLD: 0 K/UL (ref 0–0.01)
NRBC BLD-RTO: 0 PER 100 WBC
PLATELET # BLD AUTO: 208 K/UL (ref 135–420)
PMV BLD AUTO: 10.9 FL (ref 9.2–11.8)
POTASSIUM SERPL-SCNC: 3.6 MMOL/L (ref 3.5–5.5)
RBC # BLD AUTO: 3.39 M/UL (ref 4.35–5.65)
SODIUM SERPL-SCNC: 143 MMOL/L (ref 136–145)
WBC # BLD AUTO: 5.8 K/UL (ref 4.6–13.2)

## 2024-07-24 PROCEDURE — 85027 COMPLETE CBC AUTOMATED: CPT

## 2024-07-24 PROCEDURE — 93005 ELECTROCARDIOGRAM TRACING: CPT | Performed by: UROLOGY

## 2024-07-24 PROCEDURE — 83036 HEMOGLOBIN GLYCOSYLATED A1C: CPT

## 2024-07-24 PROCEDURE — 80048 BASIC METABOLIC PNL TOTAL CA: CPT

## 2024-07-25 LAB
EKG ATRIAL RATE: 59 BPM
EKG DIAGNOSIS: NORMAL
EKG P AXIS: 79 DEGREES
EKG P-R INTERVAL: 202 MS
EKG Q-T INTERVAL: 410 MS
EKG QRS DURATION: 74 MS
EKG QTC CALCULATION (BAZETT): 405 MS
EKG R AXIS: 72 DEGREES
EKG T AXIS: 87 DEGREES
EKG VENTRICULAR RATE: 59 BPM

## 2024-07-29 ENCOUNTER — ANESTHESIA EVENT (OUTPATIENT)
Facility: HOSPITAL | Age: 79
End: 2024-07-29
Payer: MEDICARE

## 2024-08-06 ENCOUNTER — HOSPITAL ENCOUNTER (OUTPATIENT)
Facility: HOSPITAL | Age: 79
Setting detail: OUTPATIENT SURGERY
Discharge: HOME | End: 2024-08-06
Attending: UROLOGY | Admitting: UROLOGY
Payer: MEDICARE

## 2024-08-06 ENCOUNTER — ANESTHESIA (OUTPATIENT)
Facility: HOSPITAL | Age: 79
End: 2024-08-06
Payer: MEDICARE

## 2024-08-06 VITALS
HEART RATE: 66 BPM | TEMPERATURE: 98 F | RESPIRATION RATE: 11 BRPM | OXYGEN SATURATION: 93 % | HEIGHT: 68 IN | WEIGHT: 211.3 LBS | BODY MASS INDEX: 32.02 KG/M2 | DIASTOLIC BLOOD PRESSURE: 66 MMHG | SYSTOLIC BLOOD PRESSURE: 128 MMHG

## 2024-08-06 LAB
GLUCOSE BLD STRIP.AUTO-MCNC: 130 MG/DL (ref 70–110)
GLUCOSE BLD STRIP.AUTO-MCNC: 143 MG/DL (ref 70–110)

## 2024-08-06 PROCEDURE — 7100000011 HC PHASE II RECOVERY - ADDTL 15 MIN: Performed by: UROLOGY

## 2024-08-06 PROCEDURE — 3700000000 HC ANESTHESIA ATTENDED CARE: Performed by: UROLOGY

## 2024-08-06 PROCEDURE — 3600000012 HC SURGERY LEVEL 2 ADDTL 15MIN: Performed by: UROLOGY

## 2024-08-06 PROCEDURE — 3700000001 HC ADD 15 MINUTES (ANESTHESIA): Performed by: UROLOGY

## 2024-08-06 PROCEDURE — C1726 CATH, BAL DIL, NON-VASCULAR: HCPCS | Performed by: UROLOGY

## 2024-08-06 PROCEDURE — 3600000002 HC SURGERY LEVEL 2 BASE: Performed by: UROLOGY

## 2024-08-06 PROCEDURE — 6360000002 HC RX W HCPCS: Performed by: NURSE ANESTHETIST, CERTIFIED REGISTERED

## 2024-08-06 PROCEDURE — 2580000003 HC RX 258: Performed by: UROLOGY

## 2024-08-06 PROCEDURE — 2720000010 HC SURG SUPPLY STERILE: Performed by: UROLOGY

## 2024-08-06 PROCEDURE — 7100000010 HC PHASE II RECOVERY - FIRST 15 MIN: Performed by: UROLOGY

## 2024-08-06 PROCEDURE — 82962 GLUCOSE BLOOD TEST: CPT

## 2024-08-06 PROCEDURE — 2709999900 HC NON-CHARGEABLE SUPPLY: Performed by: UROLOGY

## 2024-08-06 PROCEDURE — 7100000000 HC PACU RECOVERY - FIRST 15 MIN: Performed by: UROLOGY

## 2024-08-06 PROCEDURE — 2500000003 HC RX 250 WO HCPCS: Performed by: NURSE ANESTHETIST, CERTIFIED REGISTERED

## 2024-08-06 PROCEDURE — 6360000002 HC RX W HCPCS: Performed by: UROLOGY

## 2024-08-06 PROCEDURE — 7100000001 HC PACU RECOVERY - ADDTL 15 MIN: Performed by: UROLOGY

## 2024-08-06 RX ORDER — LEVOFLOXACIN 250 MG/1
250 TABLET, FILM COATED ORAL DAILY
COMMUNITY
Start: 2024-08-04

## 2024-08-06 RX ORDER — EPHEDRINE SULFATE/0.9% NACL/PF 50 MG/5 ML
SYRINGE (ML) INTRAVENOUS PRN
Status: DISCONTINUED | OUTPATIENT
Start: 2024-08-06 | End: 2024-08-06 | Stop reason: SDUPTHER

## 2024-08-06 RX ORDER — ONDANSETRON 2 MG/ML
INJECTION INTRAMUSCULAR; INTRAVENOUS PRN
Status: DISCONTINUED | OUTPATIENT
Start: 2024-08-06 | End: 2024-08-06 | Stop reason: SDUPTHER

## 2024-08-06 RX ORDER — SODIUM CHLORIDE 9 MG/ML
INJECTION, SOLUTION INTRAVENOUS PRN
Status: DISCONTINUED | OUTPATIENT
Start: 2024-08-06 | End: 2024-08-06 | Stop reason: HOSPADM

## 2024-08-06 RX ORDER — LEVOFLOXACIN 5 MG/ML
500 INJECTION, SOLUTION INTRAVENOUS
Status: COMPLETED | OUTPATIENT
Start: 2024-08-06 | End: 2024-08-06

## 2024-08-06 RX ORDER — PROPOFOL 10 MG/ML
INJECTION, EMULSION INTRAVENOUS PRN
Status: DISCONTINUED | OUTPATIENT
Start: 2024-08-06 | End: 2024-08-06 | Stop reason: SDUPTHER

## 2024-08-06 RX ORDER — SODIUM CHLORIDE 0.9 % (FLUSH) 0.9 %
5-40 SYRINGE (ML) INJECTION PRN
Status: DISCONTINUED | OUTPATIENT
Start: 2024-08-06 | End: 2024-08-06 | Stop reason: HOSPADM

## 2024-08-06 RX ORDER — HYDROMORPHONE HYDROCHLORIDE 1 MG/ML
0.5 INJECTION, SOLUTION INTRAMUSCULAR; INTRAVENOUS; SUBCUTANEOUS EVERY 5 MIN PRN
Status: DISCONTINUED | OUTPATIENT
Start: 2024-08-06 | End: 2024-08-06 | Stop reason: HOSPADM

## 2024-08-06 RX ORDER — SODIUM CHLORIDE 0.9 % (FLUSH) 0.9 %
5-40 SYRINGE (ML) INJECTION EVERY 12 HOURS SCHEDULED
Status: DISCONTINUED | OUTPATIENT
Start: 2024-08-06 | End: 2024-08-06 | Stop reason: HOSPADM

## 2024-08-06 RX ORDER — SODIUM CHLORIDE, SODIUM LACTATE, POTASSIUM CHLORIDE, CALCIUM CHLORIDE 600; 310; 30; 20 MG/100ML; MG/100ML; MG/100ML; MG/100ML
INJECTION, SOLUTION INTRAVENOUS CONTINUOUS
Status: DISCONTINUED | OUTPATIENT
Start: 2024-08-06 | End: 2024-08-06 | Stop reason: HOSPADM

## 2024-08-06 RX ORDER — LIDOCAINE HYDROCHLORIDE 20 MG/ML
INJECTION, SOLUTION INTRAVENOUS PRN
Status: DISCONTINUED | OUTPATIENT
Start: 2024-08-06 | End: 2024-08-06 | Stop reason: SDUPTHER

## 2024-08-06 RX ORDER — NALOXONE HYDROCHLORIDE 0.4 MG/ML
INJECTION, SOLUTION INTRAMUSCULAR; INTRAVENOUS; SUBCUTANEOUS PRN
Status: DISCONTINUED | OUTPATIENT
Start: 2024-08-06 | End: 2024-08-06 | Stop reason: HOSPADM

## 2024-08-06 RX ORDER — MIDAZOLAM HYDROCHLORIDE 1 MG/ML
INJECTION INTRAMUSCULAR; INTRAVENOUS PRN
Status: DISCONTINUED | OUTPATIENT
Start: 2024-08-06 | End: 2024-08-06 | Stop reason: SDUPTHER

## 2024-08-06 RX ORDER — FENTANYL CITRATE 50 UG/ML
25 INJECTION, SOLUTION INTRAMUSCULAR; INTRAVENOUS EVERY 5 MIN PRN
Status: DISCONTINUED | OUTPATIENT
Start: 2024-08-06 | End: 2024-08-06 | Stop reason: HOSPADM

## 2024-08-06 RX ORDER — DEXAMETHASONE SODIUM PHOSPHATE 4 MG/ML
INJECTION, SOLUTION INTRA-ARTICULAR; INTRALESIONAL; INTRAMUSCULAR; INTRAVENOUS; SOFT TISSUE PRN
Status: DISCONTINUED | OUTPATIENT
Start: 2024-08-06 | End: 2024-08-06 | Stop reason: SDUPTHER

## 2024-08-06 RX ADMIN — LIDOCAINE HYDROCHLORIDE 100 MG: 20 INJECTION, SOLUTION INTRAVENOUS at 11:18

## 2024-08-06 RX ADMIN — SODIUM CHLORIDE, SODIUM LACTATE, POTASSIUM CHLORIDE, AND CALCIUM CHLORIDE: 600; 310; 30; 20 INJECTION, SOLUTION INTRAVENOUS at 11:44

## 2024-08-06 RX ADMIN — HYDROMORPHONE HYDROCHLORIDE 1 MG: 1 INJECTION, SOLUTION INTRAMUSCULAR; INTRAVENOUS; SUBCUTANEOUS at 11:21

## 2024-08-06 RX ADMIN — LEVOFLOXACIN 500 MG: 5 INJECTION, SOLUTION INTRAVENOUS at 11:12

## 2024-08-06 RX ADMIN — MIDAZOLAM 2 MG: 1 INJECTION INTRAMUSCULAR; INTRAVENOUS at 11:12

## 2024-08-06 RX ADMIN — ONDANSETRON HYDROCHLORIDE 4 MG: 2 INJECTION INTRAMUSCULAR; INTRAVENOUS at 11:25

## 2024-08-06 RX ADMIN — PROPOFOL 150 MG: 10 INJECTION, EMULSION INTRAVENOUS at 11:18

## 2024-08-06 RX ADMIN — Medication 10 MG: at 11:31

## 2024-08-06 RX ADMIN — DEXAMETHASONE SODIUM PHOSPHATE 4 MG: 4 INJECTION, SOLUTION INTRAMUSCULAR; INTRAVENOUS at 11:25

## 2024-08-06 RX ADMIN — SODIUM CHLORIDE, SODIUM LACTATE, POTASSIUM CHLORIDE, AND CALCIUM CHLORIDE: 600; 310; 30; 20 INJECTION, SOLUTION INTRAVENOUS at 08:54

## 2024-08-06 ASSESSMENT — PAIN SCALES - GENERAL
PAINLEVEL_OUTOF10: 0

## 2024-08-06 ASSESSMENT — COPD QUESTIONNAIRES: CAT_SEVERITY: MODERATE

## 2024-08-06 NOTE — DISCHARGE INSTRUCTIONS
Restart eliquis on Friday morning    DISCHARGE SUMMARY from Nurse    PATIENT INSTRUCTIONS:    After general anesthesia or intravenous sedation, for 24 hours or while taking prescription Narcotics:  Limit your activities  Do not drive and operate hazardous machinery  Do not make important personal or business decisions  Do  not drink alcoholic beverages  If you have not urinated within 8 hours after discharge, please contact your surgeon on call.    Report the following to your surgeon:  Excessive pain, swelling, redness or odor of or around the surgical area  Temperature over 100.5  Nausea and vomiting lasting longer than 4 hours or if unable to take medications  Any signs of decreased circulation or nerve impairment to extremity: change in color, persistent  numbness, tingling, coldness or increase pain  Any questions    What to do at Home:  Recommended activity: activity as tolerated    *  Please give a list of your current medications to your Primary Care Provider.    *  Please update this list whenever your medications are discontinued, doses are      changed, or new medications (including over-the-counter products) are added.    *  Please carry medication information at all times in case of emergency situations.    These are general instructions for a healthy lifestyle:    No smoking/ No tobacco products/ Avoid exposure to second hand smoke  Surgeon General's Warning:  Quitting smoking now greatly reduces serious risk to your health.    Obesity, smoking, and sedentary lifestyle greatly increases your risk for illness    A healthy diet, regular physical exercise & weight monitoring are important for maintaining a healthy lifestyle    You may be retaining fluid if you have a history of heart failure or if you experience any of the following symptoms:  Weight gain of 3 pounds or more overnight or 5 pounds in a week, increased swelling in our hands or feet or shortness of breath while lying flat in bed.  Please

## 2024-08-06 NOTE — PERIOP NOTE
Reviewed medications with daughter. Notified A Roberto RN to make Dr Baumann aware patient had Eliquis last 8/1/24.

## 2024-08-06 NOTE — PERIOP NOTE
TRANSFER - IN REPORT:    Verbal report received from ORN & CRNA on Sam Nunez  being received from OR  for routine post-op      Report consisted of patient's Situation, Background, Assessment and   Recommendations(SBAR).     Information from the following report(s) Nurse Handoff Report was reviewed with the receiving nurse.    Opportunity for questions and clarification was provided.      Assessment completed upon patient's arrival to unit and care assumed.    The suprapubic catheter plugged dressed with 4x4 and tape from OR.

## 2024-08-06 NOTE — ANESTHESIA POSTPROCEDURE EVALUATION
Department of Anesthesiology  Postprocedure Note    Patient: Sam Nunez  MRN: [id number]  YOB: 1945  Date of evaluation: 8/6/2024    Procedure Summary       Date: 08/06/24 Room / Location: [facility] MAIN 04 / [facility] MAIN OR    Anesthesia Start: 1112 Anesthesia Stop: 1159    Procedure: REZUM WATER VAPOR THERAPY OF PROSTATE, SUPRAPUBIC CATHETER PLACEMENT Diagnosis:       Enlarged prostate with urinary obstruction      Enlarged prostate with urinary retention      (Enlarged prostate with urinary obstruction [N40.1, N13.8])      (Enlarged prostate with urinary retention [N40.1, R33.8])    Surgeons: Roney Baumann MD Responsible Provider: Vielka Alvarez MD    Anesthesia Type: general ASA Status: 3            Anesthesia Type: No value filed.    Kelley Phase I:      Kelley Phase II:      Anesthesia Post Evaluation    Patient location during evaluation: PACU  Patient participation: complete - patient participated  Level of consciousness: awake  Pain score: 0  Airway patency: patent  Nausea & Vomiting: no nausea and no vomiting  Cardiovascular status: blood pressure returned to baseline  Respiratory status: acceptable  Hydration status: stable  Multimodal analgesia pain management approach  Pain management: satisfactory to patient    No notable events documented.

## 2024-08-06 NOTE — ANESTHESIA PRE PROCEDURE
lb 6.4 oz)     Body mass index is 32.13 kg/m².    CBC:   Lab Results   Component Value Date/Time    WBC 5.8 07/24/2024 01:50 PM    RBC 3.39 07/24/2024 01:50 PM    HGB 9.9 07/24/2024 01:50 PM    HCT 30.4 07/24/2024 01:50 PM    MCV 89.7 07/24/2024 01:50 PM    RDW 14.0 07/24/2024 01:50 PM     07/24/2024 01:50 PM       CMP:   Lab Results   Component Value Date/Time     07/24/2024 01:50 PM    K 3.6 07/24/2024 01:50 PM     07/24/2024 01:50 PM    CO2 27 07/24/2024 01:50 PM    BUN 49 07/24/2024 01:50 PM    CREATININE 2.32 07/24/2024 01:50 PM    GFRAA 50 12/09/2021 11:13 AM    AGRATIO 1.2 12/09/2021 11:13 AM    LABGLOM 28 07/24/2024 01:50 PM    GLUCOSE 117 07/24/2024 01:50 PM    CALCIUM 8.6 07/24/2024 01:50 PM    BILITOT 0.5 12/09/2021 11:13 AM    ALKPHOS 82 12/09/2021 11:13 AM    AST 19 12/09/2021 11:13 AM    ALT 28 12/09/2021 11:13 AM       POC Tests:   Recent Labs     08/06/24  0851   POCGLU 130*       Coags: No results found for: \"PROTIME\", \"INR\", \"APTT\"    HCG (If Applicable): No results found for: \"PREGTESTUR\", \"PREGSERUM\", \"HCG\", \"HCGQUANT\"     ABGs: No results found for: \"PHART\", \"PO2ART\", \"QRD6ICJ\", \"MIU9UVJ\", \"BEART\", \"V8MBGPHN\"     Type & Screen (If Applicable):  No results found for: \"LABABO\"    Drug/Infectious Status (If Applicable):  No results found for: \"HIV\", \"HEPCAB\"    COVID-19 Screening (If Applicable): No results found for: \"COVID19\"        Anesthesia Evaluation  Patient summary reviewed and Nursing notes reviewed   history of anesthetic complications:   Airway: Mallampati: II  TM distance: >3 FB   Neck ROM: full  Mouth opening: > = 3 FB   Dental:    (+) implants      Pulmonary: breath sounds clear to auscultation  (+)  COPD:    sleep apnea:       asthma:                            Cardiovascular:Negative CV ROS  Exercise tolerance: good (>4 METS)  (+) hypertension:      ECG reviewed  Rhythm: regular  Rate: normal                    Neuro/Psych:   (+) psychiatric history:

## 2024-08-06 NOTE — PERIOP NOTE
TRANSFER - OUT REPORT:    Verbal report given to CARTER Nguyen on Sam Nunez  being transferred to Phase 2 for routine post-op       Report consisted of patient's Situation, Background, Assessment and   Recommendations(SBAR).     Information from the following report(s) Nurse Handoff Report was reviewed with the receiving nurse.           Lines:   Peripheral IV 08/06/24 Left;Proximal;Anterior Forearm (Active)   Site Assessment Clean, dry & intact 08/06/24 1355   Line Status Flushed;Infusing 08/06/24 1355   Line Care Connections checked and tightened 08/06/24 1355   Phlebitis Assessment No symptoms 08/06/24 1355   Infiltration Assessment 0 08/06/24 1355   Alcohol Cap Used No 08/06/24 1203   Dressing Status Clean, dry & intact 08/06/24 1355   Dressing Type Transparent 08/06/24 1355        Opportunity for questions and clarification was provided.      Patient transported with:  Registered Nurse

## 2024-08-06 NOTE — BRIEF OP NOTE
Brief Postoperative Note      Patient: Sam Nunez  YOB: 1945  MRN: [id number]    Date of Procedure: 8/6/2024    Pre-Op Diagnosis Codes:     * Enlarged prostate with urinary obstruction [N40.1, N13.8]     * Enlarged prostate with urinary retention [N40.1, R33.8]    Post-Op Diagnosis: Same       Procedure(s):  CYSTOSCOPY, REZUM WATER VAPOR THERAPY OF PROSTATE, SUPRAPUBIC CATHETER PLACEMENT    Surgeon(s):  Roney Mensah MD    Assistant:  Surgical Assistant: Michelle Mays    Anesthesia: GENERAL    Estimated Blood Loss (mL): Minimal    Complications: None    Specimens:   * No specimens in log *    Implants:  * No implants in log *      Drains:   Urinary Catheter 08/06/24 Coude (Active)   Catheter Indications Perioperative use for selected surgical procedures 08/06/24 1241   Site Assessment No urethral drainage 08/06/24 1241   Urine Color Cherry 08/06/24 1241   Urine Appearance Cloudy 08/06/24 1241   Collection Container Leg bag 08/06/24 1157   Securement Method Securing device (Describe) 08/06/24 1241   Status Draining;Patent 08/06/24 1241   Output (mL) 125 mL 08/06/24 1343       Suprapubic Catheter  (Active)   Status Clamped 08/06/24 1241       [REMOVED] Urinary Catheter 08/06/24 (Removed)       Findings:  Infection Present At Time Of Surgery (PATOS) (choose all levels that have infection present):  No infection present  Other Findings: 4 INJECTIONS OF REZUM/STERAM: 2 ON THE RIGHT AND 2 ON THE LEFT.  AN 18 FR sp TUBE PLACED UNDER DIRECT VISION.      Electronically signed by RONEY MENSAH MD on 8/6/2024 at 1:45 PM

## 2024-08-06 NOTE — PERIOP NOTE
Reviewed PTA medication list with patient/caregiver and patient/caregiver denies any additional medications.     Patient admits to having a responsible adult care for them at home for at least 24 hours after surgery.    Patient encouraged to use gown warming system and informed that using said warming gown to regulate body temperature prior to a procedure has been shown to help reduce the risks of blood clots and infection.    Patient's pharmacy of choice verified and documented in PTA medication section.    Dual skin assessment & fall risk band verification completed with A David MACHADO.

## 2024-08-06 NOTE — PERIOP NOTE
Dr. Baumann at the bedside was notified of output from leg bag currently 125 ml cherry colored urine with no clots seen.  Suprapubic catheter remains plugged was told by surgeon to apply a dressing of abd and tape.  Patient denies pain or discomfort vital signs stable ready for phase 2 level of care.

## 2024-08-06 NOTE — INTERVAL H&P NOTE
Update History & Physical    The patient's History and Physical of August 5, 2024 was reviewed with the patient and I examined the patient. There was no change. The surgical site was confirmed by the patient and me.     Plan: The risks, benefits, expected outcome, and alternative to the recommended procedure have been discussed with the patient. Patient understands and wants to proceed with the procedure.     Electronically signed by RUTH ANN MENSAH MD on 8/6/2024 at 10:38 AM

## 2024-08-07 NOTE — OP NOTE
into the bladder.  The suprapubic tube was stitched in place using a 2-0 nylon suture.  A sterile dressing was applied.  The patient was then taken out of lithotomy position, revived from anesthesia, and taken to recovery in stable condition.    DRAINS:  There was a 20-[place] coude tip Lucas catheter and an 18-[place] suprapubic tube.        MD PAM CHOUDHARY/AQS  D:  08/06/2024 16:00:56  T:  08/06/2024 23:21:45  JOB #:  [id number]

## 2025-08-27 ENCOUNTER — OFFICE VISIT (OUTPATIENT)
Age: 80
End: 2025-08-27
Payer: MEDICARE

## 2025-08-27 VITALS
OXYGEN SATURATION: 93 % | TEMPERATURE: 97.5 F | RESPIRATION RATE: 20 BRPM | BODY MASS INDEX: 35.49 KG/M2 | SYSTOLIC BLOOD PRESSURE: 138 MMHG | HEART RATE: 89 BPM | WEIGHT: 234.2 LBS | DIASTOLIC BLOOD PRESSURE: 86 MMHG | HEIGHT: 68 IN

## 2025-08-27 DIAGNOSIS — Z79.01 CHRONIC ANTICOAGULATION: ICD-10-CM

## 2025-08-27 DIAGNOSIS — K42.0 INCARCERATED UMBILICAL HERNIA: Primary | ICD-10-CM

## 2025-08-27 DIAGNOSIS — E66.812 CLASS 2 OBESITY WITHOUT SERIOUS COMORBIDITY WITH BODY MASS INDEX (BMI) OF 35.0 TO 35.9 IN ADULT, UNSPECIFIED OBESITY TYPE: ICD-10-CM

## 2025-08-27 PROCEDURE — 1126F AMNT PAIN NOTED NONE PRSNT: CPT | Performed by: SURGERY

## 2025-08-27 PROCEDURE — 99204 OFFICE O/P NEW MOD 45 MIN: CPT | Performed by: SURGERY

## 2025-08-27 PROCEDURE — 1123F ACP DISCUSS/DSCN MKR DOCD: CPT | Performed by: SURGERY

## 2025-08-29 ASSESSMENT — ENCOUNTER SYMPTOMS
NAUSEA: 0
CONSTIPATION: 0
VOMITING: 0
SHORTNESS OF BREATH: 0
ABDOMINAL PAIN: 1
DIARRHEA: 0
CHEST TIGHTNESS: 0

## (undated) DEVICE — PLUG CATH TAPR GRP HNDL CAP

## (undated) DEVICE — SYRINGE MED 25GA 3ML L5/8IN SUBQ PLAS W/ DETACH NDL SFTY

## (undated) DEVICE — STERILE POLYISOPRENE POWDER-FREE SURGICAL GLOVES: Brand: PROTEXIS

## (undated) DEVICE — GAUZE,SPONGE,4"X4",16PLY,STRL,LF,10/TRAY: Brand: MEDLINE

## (undated) DEVICE — SUTURE NONABSORBABLE MONOFILAMENT 2-0 FS 18 IN ETHILON 664H

## (undated) DEVICE — INFLATION DEVICE: Brand: ENCORE 26

## (undated) DEVICE — SYR 20ML LL STRL LF --

## (undated) DEVICE — MEDI-VAC NON-CONDUCTIVE SUCTION TUBING: Brand: CARDINAL HEALTH

## (undated) DEVICE — ENDOSCOPY PUMP TUBING/ CAP SET: Brand: ERBE

## (undated) DEVICE — POUCH DRNGE FLX BND INTEGR RAIL CLMP DISP EZ CTCH

## (undated) DEVICE — GOWN ISOL IMPERV UNIV, DISP, OPEN BACK, BLUE --

## (undated) DEVICE — SOLUTION IRRIG 1000ML H2O STRL BLT

## (undated) DEVICE — BASIN EMSIS 16OZ GRAPHITE PLAS KID SHP MOLD GRAD FOR ORAL

## (undated) DEVICE — CANNULA NSL AD TBNG L14FT STD PVC O2 CRV CONN NONFLARED NSL

## (undated) DEVICE — YANKAUER,SMOOTH HANDLE,HIGH CAPACITY: Brand: MEDLINE INDUSTRIES, INC.

## (undated) DEVICE — CANNULA ORIG TL CLR W FOAM CUSHIONS AND 14FT SUPL TB 3 CHN

## (undated) DEVICE — SYR 10ML LUER LOK 1/5ML GRAD --

## (undated) DEVICE — SYR 50ML SLIP TIP NSAF LF STRL --

## (undated) DEVICE — BITE BLOCK ENDOSCP UNIV AD 6 TO 9.4 MM

## (undated) DEVICE — CATHETER SUCT TR FL TIP 14FR W/ O CTRL

## (undated) DEVICE — LINER SUCT CANSTR 3000CC PLAS SFT PRE ASSEMB W/OUT TBNG W/

## (undated) DEVICE — STRAP,POSITIONING,KNEE/BODY,FOAM,4X60": Brand: MEDLINE

## (undated) DEVICE — SOLUTION IV 1000ML 0.9% SOD CHL PH 5 INJ USP VIAFLX PLAS

## (undated) DEVICE — FORCEPS BX L240CM JAW DIA2.4MM ORNG L CAP W/ NDL DISP RAD

## (undated) DEVICE — BAG  LEG  EX-TUBING  18IN  MEDIUM  20OZ

## (undated) DEVICE — CYSTO PACK: Brand: MEDLINE INDUSTRIES, INC.

## (undated) DEVICE — FORCEPS BX L240CM JAW DIA2.8MM L CAP W/ NDL MIC MESH TOOTH

## (undated) DEVICE — FLUFF AND POLYMER UNDERPAD,EXTRA HEAVY: Brand: WINGS

## (undated) DEVICE — CATHETER URETH 16FR BLLN 5CC 2 W F SPEC M OLV COUDE TIP

## (undated) DEVICE — DEVICE DEL H2O VPR THER W/CABLE SYR SPIKE ADAPTOR VI REZUM

## (undated) DEVICE — UNDERPAD INCONT W23XL36IN STD BLU POLYPR BK FLUF SFT

## (undated) DEVICE — DRAINBAG,ANTI-REFLUX TOWER,L/F,2000ML,LL: Brand: MEDLINE

## (undated) DEVICE — STERILE LATEX POWDER-FREE SURGICAL GLOVESWITH NITRILE COATING: Brand: PROTEXIS

## (undated) DEVICE — SYRINGE MED 50ML LUERSLIP TIP

## (undated) DEVICE — HIGH PRESSURE NEPHROSTOMY BALLOON CATHETER: Brand: NEPHROMAX

## (undated) DEVICE — CATHETER URETH 18FR BLLN 5CC STD LTX 2 W F TWO OPP DRNGE